# Patient Record
Sex: FEMALE | Race: WHITE | NOT HISPANIC OR LATINO | Employment: UNEMPLOYED | URBAN - METROPOLITAN AREA
[De-identification: names, ages, dates, MRNs, and addresses within clinical notes are randomized per-mention and may not be internally consistent; named-entity substitution may affect disease eponyms.]

---

## 2018-08-30 ENCOUNTER — APPOINTMENT (OUTPATIENT)
Dept: RADIOLOGY | Facility: MEDICAL CENTER | Age: 48
DRG: 247 | End: 2018-08-30
Payer: MEDICARE

## 2018-08-30 ENCOUNTER — HOSPITAL ENCOUNTER (INPATIENT)
Facility: MEDICAL CENTER | Age: 48
LOS: 2 days | DRG: 247 | End: 2018-09-02
Attending: EMERGENCY MEDICINE | Admitting: INTERNAL MEDICINE
Payer: MEDICARE

## 2018-08-30 LAB
ALBUMIN SERPL BCP-MCNC: 4.1 G/DL (ref 3.2–4.9)
ALBUMIN/GLOB SERPL: 1.2 G/DL
ALP SERPL-CCNC: 65 U/L (ref 30–99)
ALT SERPL-CCNC: 24 U/L (ref 2–50)
ANION GAP SERPL CALC-SCNC: 6 MMOL/L (ref 0–11.9)
APTT PPP: 30 SEC (ref 24.7–36)
AST SERPL-CCNC: 31 U/L (ref 12–45)
BASOPHILS # BLD AUTO: 0.5 % (ref 0–1.8)
BASOPHILS # BLD: 0.05 K/UL (ref 0–0.12)
BILIRUB SERPL-MCNC: 0.3 MG/DL (ref 0.1–1.5)
BUN SERPL-MCNC: 12 MG/DL (ref 8–22)
CALCIUM SERPL-MCNC: 8.8 MG/DL (ref 8.5–10.5)
CHLORIDE SERPL-SCNC: 99 MMOL/L (ref 96–112)
CO2 SERPL-SCNC: 28 MMOL/L (ref 20–33)
CREAT SERPL-MCNC: 0.79 MG/DL (ref 0.5–1.4)
EKG IMPRESSION: NORMAL
EOSINOPHIL # BLD AUTO: 0.07 K/UL (ref 0–0.51)
EOSINOPHIL NFR BLD: 0.8 % (ref 0–6.9)
ERYTHROCYTE [DISTWIDTH] IN BLOOD BY AUTOMATED COUNT: 46.3 FL (ref 35.9–50)
GLOBULIN SER CALC-MCNC: 3.4 G/DL (ref 1.9–3.5)
GLUCOSE SERPL-MCNC: 107 MG/DL (ref 65–99)
HCT VFR BLD AUTO: 47.1 % (ref 37–47)
HGB BLD-MCNC: 15.6 G/DL (ref 12–16)
IMM GRANULOCYTES # BLD AUTO: 0.03 K/UL (ref 0–0.11)
IMM GRANULOCYTES NFR BLD AUTO: 0.3 % (ref 0–0.9)
INR PPP: 0.95 (ref 0.87–1.13)
LIPASE SERPL-CCNC: 112 U/L (ref 11–82)
LYMPHOCYTES # BLD AUTO: 1.85 K/UL (ref 1–4.8)
LYMPHOCYTES NFR BLD: 20.2 % (ref 22–41)
MCH RBC QN AUTO: 30.1 PG (ref 27–33)
MCHC RBC AUTO-ENTMCNC: 33.1 G/DL (ref 33.6–35)
MCV RBC AUTO: 90.8 FL (ref 81.4–97.8)
MONOCYTES # BLD AUTO: 0.74 K/UL (ref 0–0.85)
MONOCYTES NFR BLD AUTO: 8.1 % (ref 0–13.4)
NEUTROPHILS # BLD AUTO: 6.44 K/UL (ref 2–7.15)
NEUTROPHILS NFR BLD: 70.1 % (ref 44–72)
NRBC # BLD AUTO: 0 K/UL
NRBC BLD-RTO: 0 /100 WBC
PLATELET # BLD AUTO: 226 K/UL (ref 164–446)
PMV BLD AUTO: 10.5 FL (ref 9–12.9)
POTASSIUM SERPL-SCNC: 3.5 MMOL/L (ref 3.6–5.5)
PROT SERPL-MCNC: 7.5 G/DL (ref 6–8.2)
PROTHROMBIN TIME: 12.4 SEC (ref 12–14.6)
RBC # BLD AUTO: 5.19 M/UL (ref 4.2–5.4)
SODIUM SERPL-SCNC: 133 MMOL/L (ref 135–145)
TROPONIN I SERPL-MCNC: 11.06 NG/ML (ref 0–0.04)
WBC # BLD AUTO: 9.2 K/UL (ref 4.8–10.8)

## 2018-08-30 PROCEDURE — 700101 HCHG RX REV CODE 250

## 2018-08-30 PROCEDURE — 307093 HCHG TR BAND RADIAL

## 2018-08-30 PROCEDURE — 700111 HCHG RX REV CODE 636 W/ 250 OVERRIDE (IP)

## 2018-08-30 PROCEDURE — C1769 GUIDE WIRE: HCPCS

## 2018-08-30 PROCEDURE — 99291 CRITICAL CARE FIRST HOUR: CPT

## 2018-08-30 PROCEDURE — 83690 ASSAY OF LIPASE: CPT

## 2018-08-30 PROCEDURE — C1894 INTRO/SHEATH, NON-LASER: HCPCS

## 2018-08-30 PROCEDURE — 83880 ASSAY OF NATRIURETIC PEPTIDE: CPT

## 2018-08-30 PROCEDURE — 99152 MOD SED SAME PHYS/QHP 5/>YRS: CPT

## 2018-08-30 PROCEDURE — 85025 COMPLETE CBC W/AUTO DIFF WBC: CPT

## 2018-08-30 PROCEDURE — C1725 CATH, TRANSLUMIN NON-LASER: HCPCS

## 2018-08-30 PROCEDURE — 80053 COMPREHEN METABOLIC PANEL: CPT

## 2018-08-30 PROCEDURE — 4A023N7 MEASUREMENT OF CARDIAC SAMPLING AND PRESSURE, LEFT HEART, PERCUTANEOUS APPROACH: ICD-10-PCS | Performed by: INTERNAL MEDICINE

## 2018-08-30 PROCEDURE — 93005 ELECTROCARDIOGRAM TRACING: CPT | Performed by: EMERGENCY MEDICINE

## 2018-08-30 PROCEDURE — 700102 HCHG RX REV CODE 250 W/ 637 OVERRIDE(OP): Performed by: INTERNAL MEDICINE

## 2018-08-30 PROCEDURE — 76937 US GUIDE VASCULAR ACCESS: CPT

## 2018-08-30 PROCEDURE — 700105 HCHG RX REV CODE 258

## 2018-08-30 PROCEDURE — C1874 STENT, COATED/COV W/DEL SYS: HCPCS

## 2018-08-30 PROCEDURE — A9270 NON-COVERED ITEM OR SERVICE: HCPCS

## 2018-08-30 PROCEDURE — 700105 HCHG RX REV CODE 258: Performed by: INTERNAL MEDICINE

## 2018-08-30 PROCEDURE — C9606 PERC D-E COR REVASC W AMI S: HCPCS | Mod: RC

## 2018-08-30 PROCEDURE — 304952 HCHG R 2 PADS

## 2018-08-30 PROCEDURE — 85610 PROTHROMBIN TIME: CPT

## 2018-08-30 PROCEDURE — 84484 ASSAY OF TROPONIN QUANT: CPT

## 2018-08-30 PROCEDURE — A9270 NON-COVERED ITEM OR SERVICE: HCPCS | Performed by: INTERNAL MEDICINE

## 2018-08-30 PROCEDURE — 99153 MOD SED SAME PHYS/QHP EA: CPT

## 2018-08-30 PROCEDURE — 700102 HCHG RX REV CODE 250 W/ 637 OVERRIDE(OP)

## 2018-08-30 PROCEDURE — 85730 THROMBOPLASTIN TIME PARTIAL: CPT

## 2018-08-30 PROCEDURE — C1887 CATHETER, GUIDING: HCPCS

## 2018-08-30 PROCEDURE — 700111 HCHG RX REV CODE 636 W/ 250 OVERRIDE (IP): Performed by: INTERNAL MEDICINE

## 2018-08-30 PROCEDURE — 93005 ELECTROCARDIOGRAM TRACING: CPT

## 2018-08-30 PROCEDURE — B2111ZZ FLUOROSCOPY OF MULTIPLE CORONARY ARTERIES USING LOW OSMOLAR CONTRAST: ICD-10-PCS | Performed by: INTERNAL MEDICINE

## 2018-08-30 PROCEDURE — 360979 HCHG DIAGNOSTIC CATH

## 2018-08-30 PROCEDURE — B2151ZZ FLUOROSCOPY OF LEFT HEART USING LOW OSMOLAR CONTRAST: ICD-10-PCS | Performed by: INTERNAL MEDICINE

## 2018-08-30 PROCEDURE — 93458 L HRT ARTERY/VENTRICLE ANGIO: CPT

## 2018-08-30 PROCEDURE — 027034Z DILATION OF CORONARY ARTERY, ONE ARTERY WITH DRUG-ELUTING INTRALUMINAL DEVICE, PERCUTANEOUS APPROACH: ICD-10-PCS | Performed by: INTERNAL MEDICINE

## 2018-08-30 RX ORDER — HEPARIN SODIUM,PORCINE 1000/ML
VIAL (ML) INJECTION
Status: COMPLETED
Start: 2018-08-30 | End: 2018-08-30

## 2018-08-30 RX ORDER — LIDOCAINE HYDROCHLORIDE 10 MG/ML
INJECTION, SOLUTION INFILTRATION; PERINEURAL
Status: COMPLETED
Start: 2018-08-30 | End: 2018-08-30

## 2018-08-30 RX ORDER — PRASUGREL 10 MG/1
10 TABLET, FILM COATED ORAL DAILY
Status: DISCONTINUED | OUTPATIENT
Start: 2018-08-31 | End: 2018-09-02 | Stop reason: HOSPADM

## 2018-08-30 RX ORDER — MIDAZOLAM HYDROCHLORIDE 1 MG/ML
INJECTION INTRAMUSCULAR; INTRAVENOUS
Status: COMPLETED
Start: 2018-08-30 | End: 2018-08-30

## 2018-08-30 RX ORDER — PRASUGREL 10 MG/1
TABLET, FILM COATED ORAL
Status: COMPLETED
Start: 2018-08-30 | End: 2018-08-30

## 2018-08-30 RX ORDER — ONDANSETRON 2 MG/ML
4 INJECTION INTRAMUSCULAR; INTRAVENOUS EVERY 4 HOURS PRN
Status: DISCONTINUED | OUTPATIENT
Start: 2018-08-30 | End: 2018-09-02 | Stop reason: HOSPADM

## 2018-08-30 RX ORDER — ATORVASTATIN CALCIUM 80 MG/1
80 TABLET, FILM COATED ORAL EVERY EVENING
Status: DISCONTINUED | OUTPATIENT
Start: 2018-08-30 | End: 2018-09-02 | Stop reason: HOSPADM

## 2018-08-30 RX ORDER — VERAPAMIL HYDROCHLORIDE 2.5 MG/ML
INJECTION, SOLUTION INTRAVENOUS
Status: COMPLETED
Start: 2018-08-30 | End: 2018-08-30

## 2018-08-30 RX ORDER — SODIUM CHLORIDE 9 MG/ML
INJECTION, SOLUTION INTRAVENOUS CONTINUOUS
Status: DISCONTINUED | OUTPATIENT
Start: 2018-08-30 | End: 2018-08-31

## 2018-08-30 RX ORDER — PRASUGREL 10 MG/1
60 TABLET, FILM COATED ORAL ONCE
Status: ACTIVE | OUTPATIENT
Start: 2018-08-30 | End: 2018-08-31

## 2018-08-30 RX ADMIN — HEPARIN SODIUM: 1000 INJECTION, SOLUTION INTRAVENOUS; SUBCUTANEOUS at 22:39

## 2018-08-30 RX ADMIN — HEPARIN SODIUM: 200 INJECTION, SOLUTION INTRAVENOUS at 22:45

## 2018-08-30 RX ADMIN — ATORVASTATIN CALCIUM 80 MG: 80 TABLET, FILM COATED ORAL at 23:45

## 2018-08-30 RX ADMIN — MIDAZOLAM HYDROCHLORIDE 2 MG: 1 INJECTION, SOLUTION INTRAMUSCULAR; INTRAVENOUS at 23:15

## 2018-08-30 RX ADMIN — VERAPAMIL HYDROCHLORIDE 5 MG: 2.5 INJECTION, SOLUTION INTRAVENOUS at 22:42

## 2018-08-30 RX ADMIN — BIVALIRUDIN 0.2 MG/KG/HR: 250 INJECTION, POWDER, LYOPHILIZED, FOR SOLUTION INTRAVENOUS at 23:15

## 2018-08-30 RX ADMIN — LIDOCAINE HYDROCHLORIDE: 10 INJECTION, SOLUTION INFILTRATION; PERINEURAL at 22:39

## 2018-08-30 RX ADMIN — PRASUGREL 60 MG: 10 TABLET, FILM COATED ORAL at 23:22

## 2018-08-30 RX ADMIN — NITROGLYCERIN 10 ML: 20 INJECTION INTRAVENOUS at 22:39

## 2018-08-30 RX ADMIN — FENTANYL CITRATE 100 MCG: 50 INJECTION, SOLUTION INTRAMUSCULAR; INTRAVENOUS at 23:15

## 2018-08-30 ASSESSMENT — PAIN SCALES - GENERAL: PAINLEVEL_OUTOF10: 0

## 2018-08-31 ENCOUNTER — APPOINTMENT (OUTPATIENT)
Dept: RADIOLOGY | Facility: MEDICAL CENTER | Age: 48
DRG: 247 | End: 2018-08-31
Attending: EMERGENCY MEDICINE
Payer: MEDICARE

## 2018-08-31 PROBLEM — I21.11 ST ELEVATION MYOCARDIAL INFARCTION INVOLVING RIGHT CORONARY ARTERY (HCC): Status: ACTIVE | Noted: 2018-08-31

## 2018-08-31 PROBLEM — B19.20 HEPATITIS C: Status: ACTIVE | Noted: 2018-08-31

## 2018-08-31 PROBLEM — E66.9 OBESITY (BMI 30-39.9): Status: ACTIVE | Noted: 2018-08-31

## 2018-08-31 PROBLEM — Z72.0 TOBACCO ABUSE: Status: ACTIVE | Noted: 2018-08-31

## 2018-08-31 LAB
ALBUMIN SERPL BCP-MCNC: 3.7 G/DL (ref 3.2–4.9)
ALBUMIN/GLOB SERPL: 1.2 G/DL
ALP SERPL-CCNC: 60 U/L (ref 30–99)
ALT SERPL-CCNC: 24 U/L (ref 2–50)
ANION GAP SERPL CALC-SCNC: 5 MMOL/L (ref 0–11.9)
ANION GAP SERPL CALC-SCNC: 5 MMOL/L (ref 0–11.9)
AST SERPL-CCNC: 63 U/L (ref 12–45)
BASOPHILS # BLD AUTO: 0.5 % (ref 0–1.8)
BASOPHILS # BLD: 0.04 K/UL (ref 0–0.12)
BILIRUB SERPL-MCNC: 0.3 MG/DL (ref 0.1–1.5)
BNP SERPL-MCNC: 25 PG/ML (ref 0–100)
BUN SERPL-MCNC: 10 MG/DL (ref 8–22)
BUN SERPL-MCNC: 10 MG/DL (ref 8–22)
CALCIUM SERPL-MCNC: 8.5 MG/DL (ref 8.5–10.5)
CALCIUM SERPL-MCNC: 8.5 MG/DL (ref 8.5–10.5)
CHLORIDE SERPL-SCNC: 103 MMOL/L (ref 96–112)
CHLORIDE SERPL-SCNC: 103 MMOL/L (ref 96–112)
CO2 SERPL-SCNC: 27 MMOL/L (ref 20–33)
CO2 SERPL-SCNC: 27 MMOL/L (ref 20–33)
CREAT SERPL-MCNC: 0.67 MG/DL (ref 0.5–1.4)
CREAT SERPL-MCNC: 0.67 MG/DL (ref 0.5–1.4)
EKG IMPRESSION: NORMAL
EOSINOPHIL # BLD AUTO: 0.06 K/UL (ref 0–0.51)
EOSINOPHIL NFR BLD: 0.7 % (ref 0–6.9)
ERYTHROCYTE [DISTWIDTH] IN BLOOD BY AUTOMATED COUNT: 46.1 FL (ref 35.9–50)
GLOBULIN SER CALC-MCNC: 3.1 G/DL (ref 1.9–3.5)
GLUCOSE SERPL-MCNC: 101 MG/DL (ref 65–99)
GLUCOSE SERPL-MCNC: 101 MG/DL (ref 65–99)
HCT VFR BLD AUTO: 43.1 % (ref 37–47)
HGB BLD-MCNC: 14.6 G/DL (ref 12–16)
IMM GRANULOCYTES # BLD AUTO: 0.03 K/UL (ref 0–0.11)
IMM GRANULOCYTES NFR BLD AUTO: 0.4 % (ref 0–0.9)
LV EJECT FRACT  99904: 60
LV EJECT FRACT MOD 2C 99903: 58.45
LV EJECT FRACT MOD 4C 99902: 57.12
LV EJECT FRACT MOD BP 99901: 57.71
LYMPHOCYTES # BLD AUTO: 2.28 K/UL (ref 1–4.8)
LYMPHOCYTES NFR BLD: 28.1 % (ref 22–41)
MAGNESIUM SERPL-MCNC: 1.9 MG/DL (ref 1.5–2.5)
MCH RBC QN AUTO: 30.6 PG (ref 27–33)
MCHC RBC AUTO-ENTMCNC: 33.9 G/DL (ref 33.6–35)
MCV RBC AUTO: 90.4 FL (ref 81.4–97.8)
MONOCYTES # BLD AUTO: 0.69 K/UL (ref 0–0.85)
MONOCYTES NFR BLD AUTO: 8.5 % (ref 0–13.4)
NEUTROPHILS # BLD AUTO: 5.02 K/UL (ref 2–7.15)
NEUTROPHILS NFR BLD: 61.8 % (ref 44–72)
NRBC # BLD AUTO: 0 K/UL
NRBC BLD-RTO: 0 /100 WBC
PLATELET # BLD AUTO: 229 K/UL (ref 164–446)
PMV BLD AUTO: 10.7 FL (ref 9–12.9)
POTASSIUM SERPL-SCNC: 3.6 MMOL/L (ref 3.6–5.5)
POTASSIUM SERPL-SCNC: 3.6 MMOL/L (ref 3.6–5.5)
PROT SERPL-MCNC: 6.8 G/DL (ref 6–8.2)
RBC # BLD AUTO: 4.77 M/UL (ref 4.2–5.4)
SODIUM SERPL-SCNC: 135 MMOL/L (ref 135–145)
SODIUM SERPL-SCNC: 135 MMOL/L (ref 135–145)
TROPONIN I SERPL-MCNC: 72.24 NG/ML (ref 0–0.04)
WBC # BLD AUTO: 8.1 K/UL (ref 4.8–10.8)

## 2018-08-31 PROCEDURE — 80053 COMPREHEN METABOLIC PANEL: CPT

## 2018-08-31 PROCEDURE — 93005 ELECTROCARDIOGRAM TRACING: CPT | Performed by: INTERNAL MEDICINE

## 2018-08-31 PROCEDURE — 93010 ELECTROCARDIOGRAM REPORT: CPT | Performed by: INTERNAL MEDICINE

## 2018-08-31 PROCEDURE — 83735 ASSAY OF MAGNESIUM: CPT

## 2018-08-31 PROCEDURE — 99223 1ST HOSP IP/OBS HIGH 75: CPT | Mod: AI | Performed by: INTERNAL MEDICINE

## 2018-08-31 PROCEDURE — 700102 HCHG RX REV CODE 250 W/ 637 OVERRIDE(OP): Performed by: INTERNAL MEDICINE

## 2018-08-31 PROCEDURE — 84484 ASSAY OF TROPONIN QUANT: CPT

## 2018-08-31 PROCEDURE — 700105 HCHG RX REV CODE 258: Performed by: INTERNAL MEDICINE

## 2018-08-31 PROCEDURE — 85025 COMPLETE CBC W/AUTO DIFF WBC: CPT

## 2018-08-31 PROCEDURE — A9270 NON-COVERED ITEM OR SERVICE: HCPCS | Performed by: INTERNAL MEDICINE

## 2018-08-31 PROCEDURE — 71045 X-RAY EXAM CHEST 1 VIEW: CPT

## 2018-08-31 PROCEDURE — 93306 TTE W/DOPPLER COMPLETE: CPT | Mod: 26 | Performed by: INTERNAL MEDICINE

## 2018-08-31 PROCEDURE — 770022 HCHG ROOM/CARE - ICU (200)

## 2018-08-31 PROCEDURE — 93306 TTE W/DOPPLER COMPLETE: CPT

## 2018-08-31 RX ORDER — HYDROCODONE BITARTRATE AND ACETAMINOPHEN 10; 325 MG/1; MG/1
.5-1 TABLET ORAL EVERY 4 HOURS PRN
Status: DISCONTINUED | OUTPATIENT
Start: 2018-08-31 | End: 2018-09-02 | Stop reason: HOSPADM

## 2018-08-31 RX ORDER — VENLAFAXINE 75 MG/1
150 TABLET ORAL DAILY
Status: DISCONTINUED | OUTPATIENT
Start: 2018-08-31 | End: 2018-08-31

## 2018-08-31 RX ORDER — TRAZODONE HYDROCHLORIDE 50 MG/1
100 TABLET ORAL NIGHTLY
Status: DISCONTINUED | OUTPATIENT
Start: 2018-08-31 | End: 2018-08-31

## 2018-08-31 RX ORDER — LAMOTRIGINE 100 MG/1
100 TABLET ORAL 2 TIMES DAILY
Status: DISCONTINUED | OUTPATIENT
Start: 2018-08-31 | End: 2018-09-02 | Stop reason: HOSPADM

## 2018-08-31 RX ORDER — VENLAFAXINE HYDROCHLORIDE 75 MG/1
150 CAPSULE, EXTENDED RELEASE ORAL
Status: DISCONTINUED | OUTPATIENT
Start: 2018-08-31 | End: 2018-09-02 | Stop reason: HOSPADM

## 2018-08-31 RX ORDER — BISACODYL 10 MG
10 SUPPOSITORY, RECTAL RECTAL
Status: DISCONTINUED | OUTPATIENT
Start: 2018-08-31 | End: 2018-09-02 | Stop reason: HOSPADM

## 2018-08-31 RX ORDER — AMOXICILLIN 250 MG
2 CAPSULE ORAL 2 TIMES DAILY
Status: DISCONTINUED | OUTPATIENT
Start: 2018-08-31 | End: 2018-09-02 | Stop reason: HOSPADM

## 2018-08-31 RX ORDER — TRAZODONE HYDROCHLORIDE 50 MG/1
200 TABLET ORAL NIGHTLY
Status: DISCONTINUED | OUTPATIENT
Start: 2018-08-31 | End: 2018-09-02 | Stop reason: HOSPADM

## 2018-08-31 RX ORDER — POLYETHYLENE GLYCOL 3350 17 G/17G
1 POWDER, FOR SOLUTION ORAL
Status: DISCONTINUED | OUTPATIENT
Start: 2018-08-31 | End: 2018-09-02 | Stop reason: HOSPADM

## 2018-08-31 RX ORDER — VENLAFAXINE HYDROCHLORIDE 75 MG/1
150 CAPSULE, EXTENDED RELEASE ORAL
COMMUNITY
End: 2019-12-03

## 2018-08-31 RX ORDER — NITROGLYCERIN 0.4 MG/1
0.4 TABLET SUBLINGUAL
Status: DISCONTINUED | OUTPATIENT
Start: 2018-08-31 | End: 2018-09-02 | Stop reason: HOSPADM

## 2018-08-31 RX ORDER — AMLODIPINE BESYLATE 10 MG/1
10 TABLET ORAL DAILY
Status: ON HOLD | COMMUNITY
End: 2018-09-02

## 2018-08-31 RX ORDER — ACETAMINOPHEN 325 MG/1
650 TABLET ORAL EVERY 6 HOURS PRN
Status: DISCONTINUED | OUTPATIENT
Start: 2018-08-31 | End: 2018-09-02 | Stop reason: HOSPADM

## 2018-08-31 RX ADMIN — VENLAFAXINE HYDROCHLORIDE 150 MG: 75 CAPSULE, EXTENDED RELEASE ORAL at 20:58

## 2018-08-31 RX ADMIN — PRASUGREL 10 MG: 10 TABLET, FILM COATED ORAL at 05:29

## 2018-08-31 RX ADMIN — ASPIRIN 81 MG: 81 TABLET, DELAYED RELEASE ORAL at 05:28

## 2018-08-31 RX ADMIN — METOPROLOL TARTRATE 25 MG: 25 TABLET, FILM COATED ORAL at 01:23

## 2018-08-31 RX ADMIN — SODIUM CHLORIDE: 9 INJECTION, SOLUTION INTRAVENOUS at 00:00

## 2018-08-31 RX ADMIN — TRAZODONE HYDROCHLORIDE 200 MG: 50 TABLET ORAL at 20:58

## 2018-08-31 RX ADMIN — ATORVASTATIN CALCIUM 80 MG: 80 TABLET, FILM COATED ORAL at 17:06

## 2018-08-31 RX ADMIN — LAMOTRIGINE 100 MG: 100 TABLET ORAL at 01:24

## 2018-08-31 RX ADMIN — VENLAFAXINE 150 MG: 75 TABLET ORAL at 05:31

## 2018-08-31 RX ADMIN — LAMOTRIGINE 100 MG: 100 TABLET ORAL at 17:06

## 2018-08-31 RX ADMIN — TRAZODONE HYDROCHLORIDE 100 MG: 50 TABLET ORAL at 01:27

## 2018-08-31 RX ADMIN — ACETAMINOPHEN 650 MG: 325 TABLET, FILM COATED ORAL at 20:58

## 2018-08-31 RX ADMIN — Medication 1 PACKET: at 18:00

## 2018-08-31 RX ADMIN — METOPROLOL TARTRATE 25 MG: 25 TABLET, FILM COATED ORAL at 17:06

## 2018-08-31 ASSESSMENT — ENCOUNTER SYMPTOMS
HEARTBURN: 0
SEIZURES: 0
FEVER: 0
DIAPHORESIS: 0
SPUTUM PRODUCTION: 0
MUSCULOSKELETAL NEGATIVE: 1
COUGH: 0
MEMORY LOSS: 0
PND: 0
PALPITATIONS: 0
FOCAL WEAKNESS: 0
BLURRED VISION: 0
DIARRHEA: 0
NECK PAIN: 0
DEPRESSION: 0
BACK PAIN: 0
SHORTNESS OF BREATH: 0
NAUSEA: 0
MYALGIAS: 0
ABDOMINAL PAIN: 0
HEADACHES: 0
WHEEZING: 0
LOSS OF CONSCIOUSNESS: 0
VOMITING: 0
DIZZINESS: 0
SORE THROAT: 0
FLANK PAIN: 0
BLOOD IN STOOL: 0
BRUISES/BLEEDS EASILY: 0
CHILLS: 0
SHORTNESS OF BREATH: 1

## 2018-08-31 ASSESSMENT — COGNITIVE AND FUNCTIONAL STATUS - GENERAL
DAILY ACTIVITIY SCORE: 23
TOILETING: A LITTLE
MOBILITY SCORE: 22
WALKING IN HOSPITAL ROOM: A LITTLE
CLIMB 3 TO 5 STEPS WITH RAILING: A LITTLE
SUGGESTED CMS G CODE MODIFIER MOBILITY: CJ
SUGGESTED CMS G CODE MODIFIER DAILY ACTIVITY: CI

## 2018-08-31 ASSESSMENT — LIFESTYLE VARIABLES
TOTAL SCORE: 2
AVERAGE NUMBER OF DAYS PER WEEK YOU HAVE A DRINK CONTAINING ALCOHOL: 4
TOTAL SCORE: 2
EVER_SMOKED: YES
EVER_SMOKED: YES
ON A TYPICAL DAY WHEN YOU DRINK ALCOHOL HOW MANY DRINKS DO YOU HAVE: 5
DOES PATIENT WANT TO STOP DRINKING: YES
CONSUMPTION TOTAL: POSITIVE
EVER HAD A DRINK FIRST THING IN THE MORNING TO STEADY YOUR NERVES TO GET RID OF A HANGOVER: YES
DOES PATIENT WANT TO TALK TO SOMEONE ABOUT QUITTING: NO
TOTAL SCORE: 2
ALCOHOL_USE: YES
HOW MANY TIMES IN THE PAST YEAR HAVE YOU HAD 5 OR MORE DRINKS IN A DAY: 200
EVER FELT BAD OR GUILTY ABOUT YOUR DRINKING: NO
HAVE PEOPLE ANNOYED YOU BY CRITICIZING YOUR DRINKING: NO
HAVE YOU EVER FELT YOU SHOULD CUT DOWN ON YOUR DRINKING: YES

## 2018-08-31 ASSESSMENT — PAIN SCALES - GENERAL
PAINLEVEL_OUTOF10: 0
PAINLEVEL_OUTOF10: 2
PAINLEVEL_OUTOF10: 0
PAINLEVEL_OUTOF10: 3
PAINLEVEL_OUTOF10: 0

## 2018-08-31 ASSESSMENT — COPD QUESTIONNAIRES
HAVE YOU SMOKED AT LEAST 100 CIGARETTES IN YOUR ENTIRE LIFE: YES
DO YOU EVER COUGH UP ANY MUCUS OR PHLEGM?: NO/ONLY WITH OCCASIONAL COLDS OR INFECTIONS
DURING THE PAST 4 WEEKS HOW MUCH DID YOU FEEL SHORT OF BREATH: NONE/LITTLE OF THE TIME
COPD SCREENING SCORE: 2

## 2018-08-31 ASSESSMENT — PATIENT HEALTH QUESTIONNAIRE - PHQ9
7. TROUBLE CONCENTRATING ON THINGS, SUCH AS READING THE NEWSPAPER OR WATCHING TELEVISION: NOT AT ALL
3. TROUBLE FALLING OR STAYING ASLEEP OR SLEEPING TOO MUCH: SEVERAL DAYS
9. THOUGHTS THAT YOU WOULD BE BETTER OFF DEAD, OR OF HURTING YOURSELF: NOT AT ALL
8. MOVING OR SPEAKING SO SLOWLY THAT OTHER PEOPLE COULD HAVE NOTICED. OR THE OPPOSITE, BEING SO FIGETY OR RESTLESS THAT YOU HAVE BEEN MOVING AROUND A LOT MORE THAN USUAL: NOT AT ALL
5. POOR APPETITE OR OVEREATING: NOT AT ALL
6. FEELING BAD ABOUT YOURSELF - OR THAT YOU ARE A FAILURE OR HAVE LET YOURSELF OR YOUR FAMILY DOWN: NOT AL ALL
SUM OF ALL RESPONSES TO PHQ QUESTIONS 1-9: 6
SUM OF ALL RESPONSES TO PHQ9 QUESTIONS 1 AND 2: 2
4. FEELING TIRED OR HAVING LITTLE ENERGY: NEARLY EVERY DAY
1. LITTLE INTEREST OR PLEASURE IN DOING THINGS: SEVERAL DAYS
2. FEELING DOWN, DEPRESSED, IRRITABLE, OR HOPELESS: SEVERAL DAYS

## 2018-08-31 NOTE — CONSULTS
Reason of Consult: STEMI    Consulting Physician: XOCHILT Childers    HPI:  48F with Bipolar disorder, hypertension and previous history of hepatitis C presented with several hours of crushing substernal chest pain to [facility] in [facility] where she was diagnosed with an inferior ST elevation myocardial infarction.  She was given thrombolytic therapy and transported to [facility].  Upon arrival her chest pain is rated at a 1 out of 10 previously a 10 out of 10 and her ST segments have normalized.  It has been 2.5 hours since the administration of tenecteplase per EMS.  She indicates that she has a smoking history but no cardiac history, family history is reviewed and noncontributory to her current issue, she drinks but does not use drugs besides marijuana.  In the emergency department she is hemodynamically and electrically stable.    Past Medical History:   Diagnosis Date   • Behavior problem    • Bipolar 1 disorder, manic, moderate    • Chronic pain syndrome    • Drug-seeking behavior    • Foot fracture    • Hep C w/ coma, chronic    • Hypothyroid    • Migraines    • Polycythemia        Social History     Social History   • Marital status:      Spouse name: N/A   • Number of children: N/A   • Years of education: N/A     Occupational History   • Not on file.     Social History Main Topics   • Smoking status: Current Every Day Smoker   • Smokeless tobacco: Not on file   • Alcohol use Yes      Comment: weekly   • Drug use: Yes      Comment: marijuana   • Sexual activity: Not on file     Other Topics Concern   • Not on file     Social History Narrative   • No narrative on file       No current facility-administered medications on file prior to encounter.      Current Outpatient Prescriptions on File Prior to Encounter   Medication Sig Dispense Refill   • hydrocodone/acetaminophen (NORCO)  MG TABS Take 0.5-1 Tabs by mouth every four hours as needed ((Pain Scale 4-6)). 40 Tab 0   •  temazepam (RESTORIL) 15 MG CAPS Take 15 mg by mouth at bedtime as needed.     • ibuprofen (MOTRIN) 600 MG TABS Take 600 mg by mouth every 6 hours as needed.     • asa/apap/caffeine (EXCEDRIN) 250-250-65 MG TABS Take 1 Tab by mouth every 6 hours as needed. Indications: Headache     • nitroglycerin (NITROSTAT) 0.4 MG SUBL Place 0.4 mg under tongue every 5 minutes as needed. Indications: for atypical chest pain     • trazodone (DESYREL) 100 MG TABS Take 100 mg by mouth every evening.     • lamotrigine (LAMICTAL) 100 MG TABS Take 100 mg by mouth 2 Times a Day.     • venlafaxine (EFFEXOR) 50 MG tablet Take 150 mg by mouth 3 times a day.     • hydrochlorothiazide (HYDRODIURIL) 25 MG TABS Take 25 mg by mouth every day.     • propranolol (INDERAL) 20 MG TABS Take 20 mg by mouth every day.         Current Facility-Administered Medications   Medication Dose Frequency Provider Last Rate Last Dose   • HEPARIN 1000 UNITS/ML OR USE ONLY           • HEPARIN (PORCINE) IN NACL 2-0.9 UNIT/ML-% INJ SOLN           • NITROGLYCERIN 2 MG IV SOLN           • LIDOCAINE HCL 1 % INJ SOLN             Last reviewed on 3/31/2014  2:43 PM by Garett Guerrero R.N.    Compazine    No family history on file.    ROS: As per HPI all other systems reviewed and negative     Physical Exam   There were no vitals taken for this visit.    Constitutional: Obese.  Appears well-developed.   HENT: Normocephalic and atraumatic. No scleral icterus.   Neck: No JVD present.   Cardiovascular: Normal rate. Exam reveals no gallop and no friction rub. No murmur heard.   Pulmonary/Chest: CTAB    Abdominal: S/NT/ND BS+   Musculoskeletal:  Pulses present. No atrophy. Strength normal.  Extremities: Exhibits no edema. No clubbing or cyanosis.   Skin: Skin is warm and dry.   Neuro: Non-focal, CN 2-12 intact grossly    No intake or output data in the 24 hours ending 08/30/18 1177                              EKG (8/30/2018 ):  I have personally reviewed the EKG this visit  and discussed with the patient. It shows inferior ST elevation with reciprocal depressions in sinus rhythm.  Repeat completed after tenecteplase shows resolution of ST segments.    Imaging reviewed    Impressions:  1.  Inferior STEMI status post successful thrombolytic therapy  2.  Hypertension  3.  Tobacco abuse  4.  Bipolar disorder  5.  Hepatitis C  6.  Obesity    Recommendations:  Recommend urgent coronary angiography as it has been over 2 hours since her tonight to place was given.  She is stable we will proceed for possible percutaneous intervention if necessary.  Recommend dual antiplatelet therapy, optimal medical therapy for CAD, and an echocardiogram.    Thank you for this interesting consultation. It was my pleasure to see Stephanie Andrade today.    Hernan Pack MD, FACC, [facility]  Division of Interventional Cardiology  [facility]      Discussed with the referring physician and bedside nursing.

## 2018-08-31 NOTE — ED PROVIDER NOTES
"CHIEF COMPLAINT  No chief complaint on file.      HPI  Stephanie Andrade is a 48 y.o. female who presents from [facility] following chest pain evaluation showing stemi-morphology on EKG in the inferior leads.  She was given aspirin and tenecteplase prior to transfer at around 8:20 PM.    Patient states that around 6 or 6:30 PM this evening, she developed severe chest pain while at rest.  Associated diaphoresis.  No vomiting.  No syncope or shortness of breath.  Has a history of hypertension, smoking, \"atypical angina\".   Not actively followed by a cardiologist currently.  Denies prior cardiac intervention.  Denies prior history of diabetes.    Remained hemodynamically stable and was transferred with a nitro drip due to continued chest pain symptoms.  Initially had 10 out of 10 pain.  Upon arrival here, pain was 1/10.      REVIEW OF SYSTEMS  See HPI for further details. All other systems are negative.     PAST MEDICAL HISTORY   has a past medical history of Behavior problem; Bipolar 1 disorder, manic, moderate; Chronic pain syndrome; Drug-seeking behavior; Foot fracture; Hep C w/ coma, chronic; Hypothyroid; Migraines; and Polycythemia.    SOCIAL HISTORY  Social History     Social History Main Topics   • Smoking status: Current Every Day Smoker   • Smokeless tobacco: Not on file   • Alcohol use Yes      Comment: weekly   • Drug use: Yes      Comment: marijuana   • Sexual activity: Not on file       SURGICAL HISTORY   has a past surgical history that includes tibia nailing intramedullary (3/31/2014) and ankle orif (3/31/2014).    CURRENT MEDICATIONS  Home Medications     Reviewed by Elif Hackett, PharmD (Pharmacist) on 08/31/18 at 0952  Med List Status: Complete   Medication Last Dose Status   amLODIPine (NORVASC) 10 MG Tab  Active   asa/apap/caffeine (EXCEDRIN) 250-250-65 MG TABS PRN  Active   hydrochlorothiazide (HYDRODIURIL) 25 MG TABS  Active   lamotrigine (LAMICTAL) 100 MG TABS  Active " "  nitroglycerin (NITROSTAT) 0.4 MG SUBL PRN Active   propranolol (INDERAL) 20 MG TABS  Active   psyllium (METAMUCIL) 58.12 % Pack  Active   temazepam (RESTORIL) 15 MG CAPS PRN Active   trazodone (DESYREL) 100 MG TABS  Active   venlafaxine XR (EFFEXOR XR) 75 MG CAPSULE SR 24 HR  Active                ALLERGIES  Allergies   Allergen Reactions   • Compazine        PHYSICAL EXAM  VITAL SIGNS: /77   Pulse 85   Temp 36.8 °C (98.2 °F)   Resp 18   Ht 1.753 m (5' 9\")   Wt 103.9 kg (229 lb 0.9 oz)   LMP 08/31/2018   SpO2 97%   Breastfeeding? No   BMI 33.83 kg/m²   Pulse ox interpretation: I interpret this pulse ox as normal.  Constitutional: Alert in no apparent distress. Obese.  HENT: No signs of trauma, Bilateral external ears normal, Nose normal.   Eyes: Pupils are equal and reactive, Conjunctiva normal, Non-icteric.   Neck: Normal range of motion, No tenderness, Supple, No stridor.   Cardiovascular: Regular rate and rhythm, no murmurs.   Thorax & Lungs: Normal breath sounds, No respiratory distress, No wheezing, No chest tenderness.   Abdomen: Bowel sounds normal, Soft, No tenderness, No masses, No pulsatile masses. No peritoneal signs.  Skin: Warm, Dry, No erythema, No rash.   Back: No bony tenderness, No CVA tenderness.   Extremities: Intact distal pulses, No edema, No tenderness, No cyanosis  Musculoskeletal: Good range of motion in all major joints. No tenderness to palpation or major deformities noted.   Neurologic: Alert , Normal motor function and gait, Normal sensory function, No focal deficits noted.   Psychiatric: Affect normal, Judgment normal, Mood normal.       DIAGNOSTIC STUDIES / PROCEDURES    EKG   8/30/2018 at 2225  Normal sinus rhythm at 82  LA, QRS, QTC within normal limits  No ST elevations or depressions  There is some artifact due to motion  Inferior STEMI morphology resolved compared to prior EKG from the outside transferring facility where there is inferior STEMI with  And ST " depressions in V1 and V2    LABS  Labs Reviewed   TROPONIN - Abnormal; Notable for the following:        Result Value    Troponin I 11.06 (*)     All other components within normal limits    Narrative:     Indicate which anticoagulants the patient is on:->UNKNOWN   CBC WITH DIFFERENTIAL - Abnormal; Notable for the following:     Hematocrit 47.1 (*)     MCHC 33.1 (*)     Lymphocytes 20.20 (*)     All other components within normal limits    Narrative:     Indicate which anticoagulants the patient is on:->UNKNOWN   COMP METABOLIC PANEL - Abnormal; Notable for the following:     Sodium 133 (*)     Potassium 3.5 (*)     Glucose 107 (*)     All other components within normal limits    Narrative:     Indicate which anticoagulants the patient is on:->UNKNOWN   LIPASE - Abnormal; Notable for the following:     Lipase 112 (*)     All other components within normal limits    Narrative:     Indicate which anticoagulants the patient is on:->UNKNOWN   BASIC METABOLIC PANEL - Abnormal; Notable for the following:     Glucose 101 (*)     All other components within normal limits    Narrative:     Basic Metabolic Panel (BMP) in AM   COMP METABOLIC PANEL - Abnormal; Notable for the following:     Glucose 101 (*)     AST(SGOT) 63 (*)     All other components within normal limits    Narrative:     Basic Metabolic Panel (BMP) in AM   BTYPE NATRIURETIC PEPTIDE    Narrative:     Indicate which anticoagulants the patient is on:->UNKNOWN   PROTHROMBIN TIME    Narrative:     Indicate which anticoagulants the patient is on:->UNKNOWN   APTT    Narrative:     Indicate which anticoagulants the patient is on:->UNKNOWN   ESTIMATED GFR    Narrative:     Indicate which anticoagulants the patient is on:->UNKNOWN   ESTIMATED GFR    Narrative:     Basic Metabolic Panel (BMP) in AM       RADIOLOGY  DX-CHEST-PORTABLE (1 VIEW)   Final Result      1.  Minimal bilateral lower lung atelectasis.   2.  No pneumonia or pneumothorax.      ECHOCARDIOGRAM COMP W/O  CONT    (Results Pending)         COURSE & MEDICAL DECISION MAKING  Pertinent Labs & Imaging studies reviewed. (See chart for details)  48 y.o.   Female presenting as a transfer from an outside facility,  [facility], for suspected STEMI.  Had severe chest pain while at rest associated with diaphoresis.  Onset was at around 6 PM.  Has a history of hypertension and smoking.  No prior cardiovascular disease history.  No vomiting.  At the outside hospital had clear inferior STEMI morphology with reciprocal changes.  She was given tenecteplase and aspirin.  This was given at around 8:30 PM.  Patient arrived in the emergency department at around 10:30 PM.  She reports feeling much improved.  Was transferred on a nitro drip that was discontinued upon arrival.  EKG upon arrival did not show continued STEMI morphology.  Did review the patient's EKG from the outside facility with clear STEMI pattern.  Cardiology was available upon the patient's arrival.  Recommending cardiac catheterization.  Patient was agreeable with the plan.  Resting comfortably and in no distress.  Initial laboratory studies did show evidence of troponin elevation to 11.    The total critical care time on this patient is 35 minutes, resuscitating patient, speaking with admitting physician, and deciphering test results. This 35 minutes is exclusive of separately billable procedures.      Spoke with the hospitalist, Dr. Ramos who agrees to the admission.    FINAL IMPRESSION    ST elevation myocardial infarction        Electronically signed by: Alistair Silveira, 8/30/2018 10:46 PM

## 2018-08-31 NOTE — PROGRESS NOTES
Cardiology Progress Note               Author: Lolis Su Date & Time created: 8/31/2018  11:44 AM     Interval History:    No events on telemetry  Quiet after intervention  Mild achy chest pain that is constant this morning, different, 0.5 out of 10    Chief Complaint:  STEMI/IMI from [facility].  Drug-eluting stent to the culprit proximal RCA  No significant residual disease    Review of Systems   Constitutional: Negative for chills and fever.   HENT: Negative for hearing loss and tinnitus.    Respiratory: Negative for cough, shortness of breath and wheezing.    Cardiovascular: Positive for chest pain. Negative for palpitations, leg swelling and PND.   Gastrointestinal: Negative for heartburn and nausea.   Musculoskeletal: Negative.    Skin: Negative for rash.   Neurological: Negative for dizziness and loss of consciousness.   Endo/Heme/Allergies: Does not bruise/bleed easily.   Psychiatric/Behavioral: Negative for depression and memory loss.   All other systems reviewed and are negative.      Physical Exam   Constitutional: She is oriented to person, place, and time.   Obese, sitting crosslegged in bed, talkative   HENT:   Head: Normocephalic and atraumatic.   Eyes: Pupils are equal, round, and reactive to light. EOM are normal.   Neck: Neck supple. No JVD present. No thyromegaly present.   Cardiovascular: Normal rate, regular rhythm and intact distal pulses.  Exam reveals no friction rub.    No murmur heard.  Pulmonary/Chest: Breath sounds normal. No respiratory distress. She exhibits no tenderness.   Abdominal: Bowel sounds are normal. She exhibits no distension.   Musculoskeletal: She exhibits no edema or tenderness.   Lymphadenopathy:     She has no cervical adenopathy.   Neurological: She is alert and oriented to person, place, and time. She exhibits normal muscle tone. Coordination normal.   Skin: Skin is warm and dry. No rash noted.   Psychiatric: She has a normal mood and affect. Her  behavior is normal.       Hemodynamics:  Temp (24hrs), Av.4 °C (97.6 °F), Min:36.2 °C (97.1 °F), Max:37.1 °C (98.8 °F)  Temperature: 37.1 °C (98.8 °F)  Pulse  Av.2  Min: 70  Max: 97Heart Rate (Monitored): 86  Blood Pressure: 119/77, NIBP: 128/80     Respiratory:    Respiration: 19, Pulse Oximetry: 96 %, O2 Daily Delivery Respiratory : Silicone Nasal Cannula     Work Of Breathing / Effort: Mild  RUL Breath Sounds: Clear, RML Breath Sounds: Clear;Diminished, RLL Breath Sounds: Diminished, OXANA Breath Sounds: Clear, LLL Breath Sounds: Diminished  Fluids:  Date 18 0700 - 18 0659   Shift 6233-2030 0911-5800 2649-8662 24 Hour Total   I  N  T  A  K  E   I.V. 375   375    Shift Total 375   375   O  U  T  P  U  T   Urine 200   200    Shift Total 200   200   Weight (kg) 103.9 103.9 103.9 103.9       Weight: 103.9 kg (229 lb 0.9 oz)  GI/Nutrition:  Orders Placed This Encounter   Procedures   • Diet Order Cardiac     Standing Status:   Standing     Number of Occurrences:   1     Order Specific Question:   Diet:     Answer:   Cardiac [6]     Lab Results:  Recent Labs      18   0340   WBC  9.2  8.1   RBC  5.19  4.77   HEMOGLOBIN  15.6  14.6   HEMATOCRIT  47.1*  43.1   MCV  90.8  90.4   MCH  30.1  30.6   MCHC  33.1*  33.9   RDW  46.3  46.1   PLATELETCT  226  229   MPV  10.5  10.7     Recent Labs      18   0340   SODIUM  133*  135  135   POTASSIUM  3.5*  3.6  3.6   CHLORIDE  99  103  103   CO2  28  27  27   GLUCOSE  107*  101*  101*   BUN  12  10  10   CREATININE  0.79  0.67  0.67   CALCIUM  8.8  8.5  8.5     Recent Labs      18   APTT  30.0   INR  0.95     Recent Labs      18   BNPBTYPENAT  25     Recent Labs      1818   0340   TROPONINI  11.06*   --   72.24*   BNPBTYPENAT   --   25   --              Medical Decision Making, by Problem:  Active Hospital Problems    Diagnosis   • ST elevation  myocardial infarction involving right coronary artery (HCC) [I21.11]   • Tobacco abuse [Z72.0]   • Hepatitis C [B19.20]   • Bipolar 1 disorder, manic, moderate (HCC) [F31.12]       Plan:    48-year-old woman with psychiatric disease admit in transfer 8/30 for acute MI.  Stable today.  No concomitant heart block shock    Coronary disease myocardial infarction  Dual antiplatelet therapy, will check because before discharge  High-dose Lipitor  Low-dose beta-blocker  Troponin still going up, will check again  Echo pending but no heart failure symptoms today    Chest pain  Likely due to recent heart attack, no evidence of in-stent stenosis or thrombosis based on ECG    Okay to ambulate shower  We will follow, discussed with nursing, if chest pain changes will be on case  Okay for telemetry    Quality-Core Measures

## 2018-08-31 NOTE — H&P
Hospital Medicine History & Physical Note    Date of Service  8/31/2018    Primary Care Physician  Marcelo Martinez M.D.    Consultants  Cardiology     Code Status  Full code chest pain    Chief Complaint  Chest pain    History of Presenting Illness  48 y.o. female with past medical history of obesity, tobacco abuse, hypertension, hep C, bipolar disorder who presented 8/30/2018 with chest pain that started several hours prior to arrival.  She reported crushing substernal chest pain without radiation associated with shortness of breath.  She presented to an [facility] facility where she was diagnosed with an inferior ST elevation MI.  She was getting thrombolytic therapy and transferred to [facility] for further management.  She was taken for urgent cardiac catheterization and found to have 99% focal proximal RCA stenosis status post PCI and stent placement.  LVEF 65%.    EKG interpreted by me reveals sinus rhythm with Q waves in inferior leads.  Chest x-ray interpreted by me reveals minimal bilateral atelectasis.  No obvious pulmonary focal consolidations or pulmonary edema    Review of Systems  Review of Systems   Constitutional: Negative for chills, diaphoresis and fever.   HENT: Negative for hearing loss and sore throat.    Eyes: Negative for blurred vision.   Respiratory: Positive for shortness of breath. Negative for cough, sputum production and wheezing.    Cardiovascular: Positive for chest pain. Negative for palpitations and leg swelling.   Gastrointestinal: Negative for abdominal pain, blood in stool, diarrhea, nausea and vomiting.   Genitourinary: Negative for dysuria, flank pain and urgency.   Musculoskeletal: Negative for back pain, joint pain, myalgias and neck pain.   Skin: Negative for rash.   Neurological: Negative for dizziness, focal weakness, seizures and headaches.   Endo/Heme/Allergies: Does not bruise/bleed easily.   Psychiatric/Behavioral: Negative for suicidal ideas.   All other  systems reviewed and are negative.      Past Medical History   has a past medical history of Behavior problem; Bipolar 1 disorder, manic, moderate (HCC); Chronic pain syndrome; Drug-seeking behavior; Foot fracture; Hep C w/ coma, chronic (HCC); Hypothyroid; Migraines; and Polycythemia.    Surgical History   has a past surgical history that includes tibia nailing intramedullary (3/31/2014) and ankle orif (3/31/2014).     Family History  No pertinent family history    Social History   reports that she has been smoking.  She does not have any smokeless tobacco history on file. She reports that she drinks alcohol. She reports that she uses drugs.    Allergies  Allergies   Allergen Reactions   • Compazine        Medications  Prior to Admission Medications   Prescriptions Last Dose Informant Patient Reported? Taking?   asa/apap/caffeine (EXCEDRIN) 250-250-65 MG TABS PRN  at unknown  Yes No   Sig: Take 1 Tab by mouth every 6 hours as needed. Indications: Headache   hydrochlorothiazide (HYDRODIURIL) 25 MG TABS 3/31/2014 at 0700  Yes No   Sig: Take 25 mg by mouth every day.   hydrocodone/acetaminophen (NORCO)  MG TABS   No No   Sig: Take 0.5-1 Tabs by mouth every four hours as needed ((Pain Scale 4-6)).   lamotrigine (LAMICTAL) 100 MG TABS 3/30/2014 at 1900  Yes No   Sig: Take 100 mg by mouth 2 Times a Day.   nitroglycerin (NITROSTAT) 0.4 MG SUBL PRN at unknown  Yes No   Sig: Place 0.4 mg under tongue every 5 minutes as needed. Indications: for atypical chest pain   propranolol (INDERAL) 20 MG TABS 3/31/2014 at Unknown  Yes No   Sig: Take 20 mg by mouth every day.   temazepam (RESTORIL) 15 MG CAPS PRN at unknown  Yes No   Sig: Take 15 mg by mouth at bedtime as needed.   trazodone (DESYREL) 100 MG TABS 3/30/2014 at 2100  Yes No   Sig: Take 100 mg by mouth every evening.   venlafaxine (EFFEXOR) 50 MG tablet 3/31/2014 at 0700  Yes No   Sig: Take 150 mg by mouth 3 times a day.      Facility-Administered Medications: None        Physical Exam  Blood Pressure: 119/77   Temperature: 36.2 °C (97.1 °F)   Pulse: 85   Respiration: 17   Pulse Oximetry: 96 %     Physical Exam   Constitutional: She is oriented to person, place, and time. She appears well-developed and well-nourished. No distress.   Obese   HENT:   Head: Normocephalic and atraumatic.   Mouth/Throat: Oropharynx is clear and moist.   Eyes: Pupils are equal, round, and reactive to light. Conjunctivae are normal.   Neck: Neck supple. No thyromegaly present.   Cardiovascular: Normal rate, regular rhythm and normal heart sounds.    Pulmonary/Chest: Effort normal and breath sounds normal. No respiratory distress. She has no wheezes. She has no rales.   Abdominal: Soft. Bowel sounds are normal. She exhibits no distension. There is no tenderness. There is no rebound.   Musculoskeletal: Normal range of motion. She exhibits no edema or tenderness.   Neurological: She is alert and oriented to person, place, and time. No cranial nerve deficit. Coordination normal.   Skin: Skin is warm and dry.   Psychiatric: She has a normal mood and affect. Her behavior is normal.   Nursing note and vitals reviewed.      Laboratory:  Recent Labs      08/30/18 2241 08/31/18   0340   WBC  9.2  8.1   RBC  5.19  4.77   HEMOGLOBIN  15.6  14.6   HEMATOCRIT  47.1*  43.1   MCV  90.8  90.4   MCH  30.1  30.6   MCHC  33.1*  33.9   RDW  46.3  46.1   PLATELETCT  226  229   MPV  10.5  10.7     Recent Labs      08/30/18 2240 08/31/18   0340   SODIUM  133*  135  135   POTASSIUM  3.5*  3.6  3.6   CHLORIDE  99  103  103   CO2  28  27  27   GLUCOSE  107*  101*  101*   BUN  12  10  10   CREATININE  0.79  0.67  0.67   CALCIUM  8.8  8.5  8.5     Recent Labs      08/30/18 2240  08/31/18   0340   ALTSGPT  24  24   ASTSGOT  31  63*   ALKPHOSPHAT  65  60   TBILIRUBIN  0.3  0.3   LIPASE  112*   --    GLUCOSE  107*  101*  101*     Recent Labs      08/30/18 2241   APTT  30.0   INR  0.95     Recent Labs       08/30/18   2241   BNPBTYPENAT  25         Lab Results   Component Value Date    TROPONINI 11.06 (H) 08/30/2018       Urinalysis:    No results found     Imaging:  DX-CHEST-PORTABLE (1 VIEW)   Final Result      1.  Minimal bilateral lower lung atelectasis.   2.  No pneumonia or pneumothorax.      ECHOCARDIOGRAM COMP W/O CONT    (Results Pending)         Assessment/Plan:  I anticipate this patient will require at least two midnights for appropriate medical management, necessitating inpatient admission.    ST elevation myocardial infarction involving right coronary artery (HCC)- (present on admission)   Assessment & Plan    Status post thrombolytics, PCI and stent placement  Patient has been started on aspirin, Effient, metoprolol 25 twice daily and Lipitor 80  Continuous cardiac monitoring   Check 2D echo  Check lipid panel, TSH and hemoglobin A1c  Nitro and morphine when necessary for chest pain          Hepatitis C- (present on admission)   Assessment & Plan    Continue outpatient management        Tobacco abuse- (present on admission)   Assessment & Plan    Tobacco cessation education provided for more than 3 minutes, discussed options of nicotine patch, medical treatment with wellbutrin and chantix. Discussed the risks of smoking including increased risk of heart disease, stroke, cancer and COPD. Discussed the benefits of quitting smoking. Nicotine replacement protocol will be provided to the patient.          Bipolar 1 disorder, manic, moderate (HCC)- (present on admission)   Assessment & Plan    Continue trazodone, Lamictal and Effexor            VTE prophylaxis: SCD

## 2018-08-31 NOTE — PROGRESS NOTES
2 RN admission skin check complete with SEBASTIÁN Bhatia. Skin is grossly intact and free of breakdown.

## 2018-08-31 NOTE — PROGRESS NOTES
12 Hour chart/2RN skin check completed.    MS:  Patient normal sinus 70s-80s. BP stable without support. O2 saturations > 94% on RA to 2LNC.\    0.16/0.08/0.40

## 2018-08-31 NOTE — PROCEDURES
REFERRING PHYSICIAN: Dr. Silveira    PREOPERATIVE DIAGNOSIS:  1.  Inferior STEMI status post thrombotic therapy  2.  Bipolar disorder  3.  Hypertension    POSTOPERATIVE DIAGNOSIS:  1.  99% focal proximal RCA stenosis, culprit  2.  Mild nonobstructive CAD of the  3.  LVEF 65% preintervention  4.  Successful PCI culprit RCA (3.5 x 23 mm Synergy DAMEON), excellent result      PROCEDURE PERFORMED:  Selective coronary angiography of the native vessels  Left heart catheterization  Left ventriculogram  PCI of RCA DAMEON    DESCRIPTION OF PROCEDURE:  The risks and benefits of cardiac catheterization as well as the procedure itself, rationale and appropriateness were discussed with the patient today. Complications including but not limited to death, stroke, MI, urgent bypass surgery, contrast nephropathy, vascular complications, bleeding and infection were explained to the patient. The potential outcomes associated with the procedure (possible PCI, possible CABG, possible medical Rx only) were also discussed at length. The patient agreed to proceed.    The patient was transported to the catheterization laboratory in th emergent fashion. The right radial area and right groin were prepped and draped in the usual fashion. Right radial area was entered with a single through and through puncture under direct ultrasound guidance and a 6F glide sheath was placed. An intra-arterial cocktail of heparin, verapamil and nitroglycerine was administered. Over a wire, a 6 Lithuanian JL 3.5 diagnostic catheter was passed to the aortic root and used to engage the left coronaries without difficulty.  This was then exchanged for a JR4 6 Lithuanian guiding catheter used across the aortic valve contrast was administered 80 cc/s for 24 cc for left ventriculogram and pullback gradient was noted.  It was then used to engage the RCA and contrast was administered.    DESCRIPTION OF PCI:  The decision was made to intervene on the culprit coronary artery.  Bivalirudin  bolus and infusion was initiated.  Through the existing guide a BMW 0.014 floppy tip wire was navigated across the culprit stenosis. A 3.0 x 15 mm compliant balloon was used to predilate the lesion. A 3.5 x 23 mm Synergy DAMEON was then positioned and deployed at nominal pressure. Following this a the stenting balloon was used to post dilate the stent to high pressures. There was an excellent angiographic result with ROLF-3 flow and no residual stenosis in the stented segment.  All catheters and guidewires were removed and a TR band was applied to achieve patent hemostasis. Patient left the cath lab in stable condition.      Moderate sedation directly monitored by me during the case while supervising the administration of the sedation medication by an independent trained RN to assist in the monitoring of the patient's level of consciousness and physiological status. I, the supervising physician was present the entire time from beginning of medication administration until the end of the procedure from 10:51 PM until 11:21 PM. For detailed administration records please see the moderate sedation documentation in the median tab.      FINDINGS:  I. HEMODYNAMICS:    Ao: 123/84 mmHg   LEDP: 23 mmHg   Gradient on LV pullback: No    II. LEFT VENTRICULOGRAM:   LVEF SUN PROJECTION: 65 %     III. CORONARY ANGIOGRAPHY:  Left Main: Moderate caliber trifurcating no CAD  Left Anterior Descending: Moderate caliber mild nonobstructive disease.  Usual complement of diagonals.  Left Circumflex: Moderate caliber nondominant there is a high obtuse marginal/ramus intermedius.  Mild nonobstructive disease.  Right Coronary: Large dominant supplying the posterior lateral posterior descending.  99.9% focal proximal stenosis thrombotic consistent with plaque rupture and the culprit lesion.  Postintervention there is 0% residual stenosis and ROLF-3 flow.    COMPLICATIONS: none apparent    CONCLUSIONS:  1.  99% focal proximal RCA stenosis,  culprit  2.  Mild nonobstructive CAD of the  3.  LVEF 65% preintervention  4.  Successful PCI culprit RCA (3.5 x 23 mm Synergy DAMEON), excellent result

## 2018-08-31 NOTE — CARE PLAN
Problem: Safety  Goal: Will remain free from injury  Outcome: PROGRESSING AS EXPECTED  PT calls for assistance appropriately; steady gait; instructed on nonskid socks and safety precautions    Problem: Respiratory:  Goal: Respiratory status will improve  Outcome: PROGRESSING AS EXPECTED  PT sating well on RA. Wearing 2LNC at night.

## 2018-08-31 NOTE — ASSESSMENT & PLAN NOTE
Status post thrombolytics at outside hospital followed by stent to the RCA on 8/31 and subsequent ICU admission.   Dual antiplatelet therapy with aspirin and Effient  Metoprolol 25 mg twice daily and Lipitor 80 mg daily  Continuous telemetry monitoring   Echocardiogram reveals a normal ejection fraction.  Lipid panel pending  Troponin went up to 72  Nitro and morphine when necessary for chest pain

## 2018-08-31 NOTE — PROGRESS NOTES
Met with patient for medication reconciliation.  Medications updated as appropriate and inpatient orders adjusted per [facility] Medication Reconciliation Protocol.    Thank you,  Elif Hackett, PharmD

## 2018-08-31 NOTE — CARE PLAN
Problem: Infection  Goal: Will remain free from infection  Outcome: PROGRESSING AS EXPECTED  Patient will remain free from infection. CHG bath give upon arrival. Frequent interdisciplinary assessments completed to assess the necessity for invasive therapies such as IVs.     Problem: Venous Thromboembolism (VTW)/Deep Vein Thrombosis (DVT) Prevention:  Goal: Patient will participate in Venous Thrombosis (VTE)/Deep Vein Thrombosis (DVT)Prevention Measures  Outcome: PROGRESSING AS EXPECTED  Patient has risk factors for VTE. Currently refusing mechanical prophylaxis despite education. Patient has multiple anticoagulative agents on board. Patient ambulating multiple times per shift. Will frequently reassess for s/s of VTE.

## 2018-08-31 NOTE — DISCHARGE PLANNING
STEMI Response    Referral: STEMI Response    Intervention: SW responded to a STEMI.  Pt was BIB [facility] after possible STEMI.  Pt was alert upon arrival.  Pts name is Stephanie Olvera (: 1970).  SVEN obtained the following pt information: SW spoke with the pt and she provided SW with an emergency contact of Kinza Garsia (daughter)  [phone]. The pt stated that medical staff is allowed to give her daughter information on the pt.   SVEN was able to contact pts daughter Kinza and she advised SW that she would be coming to [facility] tomorrow morning.  Kinza can be contact at [phone]. SVEN also provided Kinza with SW contact information if needed.     Plan: SW will remain available for pt and family support.

## 2018-09-01 LAB
ANION GAP SERPL CALC-SCNC: 7 MMOL/L (ref 0–11.9)
BASOPHILS # BLD AUTO: 0.9 % (ref 0–1.8)
BASOPHILS # BLD: 0.05 K/UL (ref 0–0.12)
BUN SERPL-MCNC: 8 MG/DL (ref 8–22)
CALCIUM SERPL-MCNC: 8.7 MG/DL (ref 8.5–10.5)
CHLORIDE SERPL-SCNC: 107 MMOL/L (ref 96–112)
CHOLEST SERPL-MCNC: 139 MG/DL (ref 100–199)
CO2 SERPL-SCNC: 25 MMOL/L (ref 20–33)
CREAT SERPL-MCNC: 0.65 MG/DL (ref 0.5–1.4)
EOSINOPHIL # BLD AUTO: 0.1 K/UL (ref 0–0.51)
EOSINOPHIL NFR BLD: 1.9 % (ref 0–6.9)
ERYTHROCYTE [DISTWIDTH] IN BLOOD BY AUTOMATED COUNT: 47.3 FL (ref 35.9–50)
EST. AVERAGE GLUCOSE BLD GHB EST-MCNC: 111 MG/DL
GLUCOSE SERPL-MCNC: 95 MG/DL (ref 65–99)
HBA1C MFR BLD: 5.5 % (ref 0–5.6)
HCT VFR BLD AUTO: 44.9 % (ref 37–47)
HDLC SERPL-MCNC: 32 MG/DL
HGB BLD-MCNC: 14.3 G/DL (ref 12–16)
IMM GRANULOCYTES # BLD AUTO: 0.01 K/UL (ref 0–0.11)
IMM GRANULOCYTES NFR BLD AUTO: 0.2 % (ref 0–0.9)
LDLC SERPL CALC-MCNC: 60 MG/DL
LYMPHOCYTES # BLD AUTO: 1.79 K/UL (ref 1–4.8)
LYMPHOCYTES NFR BLD: 33.4 % (ref 22–41)
MCH RBC QN AUTO: 29.1 PG (ref 27–33)
MCHC RBC AUTO-ENTMCNC: 31.8 G/DL (ref 33.6–35)
MCV RBC AUTO: 91.3 FL (ref 81.4–97.8)
MONOCYTES # BLD AUTO: 0.51 K/UL (ref 0–0.85)
MONOCYTES NFR BLD AUTO: 9.5 % (ref 0–13.4)
NEUTROPHILS # BLD AUTO: 2.9 K/UL (ref 2–7.15)
NEUTROPHILS NFR BLD: 54.1 % (ref 44–72)
NRBC # BLD AUTO: 0 K/UL
NRBC BLD-RTO: 0 /100 WBC
PLATELET # BLD AUTO: 201 K/UL (ref 164–446)
PMV BLD AUTO: 10.5 FL (ref 9–12.9)
POTASSIUM SERPL-SCNC: 3.8 MMOL/L (ref 3.6–5.5)
RBC # BLD AUTO: 4.92 M/UL (ref 4.2–5.4)
SODIUM SERPL-SCNC: 139 MMOL/L (ref 135–145)
TRIGL SERPL-MCNC: 236 MG/DL (ref 0–149)
TROPONIN I SERPL-MCNC: 6.23 NG/ML (ref 0–0.04)
TSH SERPL DL<=0.005 MIU/L-ACNC: 3.42 UIU/ML (ref 0.38–5.33)
WBC # BLD AUTO: 5.4 K/UL (ref 4.8–10.8)

## 2018-09-01 PROCEDURE — 84484 ASSAY OF TROPONIN QUANT: CPT

## 2018-09-01 PROCEDURE — 700102 HCHG RX REV CODE 250 W/ 637 OVERRIDE(OP): Performed by: HOSPITALIST

## 2018-09-01 PROCEDURE — 700102 HCHG RX REV CODE 250 W/ 637 OVERRIDE(OP): Performed by: INTERNAL MEDICINE

## 2018-09-01 PROCEDURE — 84443 ASSAY THYROID STIM HORMONE: CPT

## 2018-09-01 PROCEDURE — 80048 BASIC METABOLIC PNL TOTAL CA: CPT

## 2018-09-01 PROCEDURE — 770022 HCHG ROOM/CARE - ICU (200)

## 2018-09-01 PROCEDURE — A9270 NON-COVERED ITEM OR SERVICE: HCPCS | Performed by: INTERNAL MEDICINE

## 2018-09-01 PROCEDURE — 80061 LIPID PANEL: CPT

## 2018-09-01 PROCEDURE — A9270 NON-COVERED ITEM OR SERVICE: HCPCS | Performed by: HOSPITALIST

## 2018-09-01 PROCEDURE — 85025 COMPLETE CBC W/AUTO DIFF WBC: CPT

## 2018-09-01 PROCEDURE — 83036 HEMOGLOBIN GLYCOSYLATED A1C: CPT

## 2018-09-01 PROCEDURE — 99233 SBSQ HOSP IP/OBS HIGH 50: CPT | Performed by: HOSPITALIST

## 2018-09-01 RX ORDER — NICOTINE 21 MG/24HR
21 PATCH, TRANSDERMAL 24 HOURS TRANSDERMAL
Status: DISCONTINUED | OUTPATIENT
Start: 2018-09-01 | End: 2018-09-02 | Stop reason: HOSPADM

## 2018-09-01 RX ORDER — OMEPRAZOLE 20 MG/1
20 CAPSULE, DELAYED RELEASE ORAL DAILY
COMMUNITY
End: 2019-12-03

## 2018-09-01 RX ADMIN — NICOTINE 21 MG: 21 PATCH, EXTENDED RELEASE TRANSDERMAL at 12:58

## 2018-09-01 RX ADMIN — METOPROLOL TARTRATE 25 MG: 25 TABLET, FILM COATED ORAL at 16:56

## 2018-09-01 RX ADMIN — ASPIRIN 81 MG: 81 TABLET, DELAYED RELEASE ORAL at 05:18

## 2018-09-01 RX ADMIN — VENLAFAXINE HYDROCHLORIDE 150 MG: 75 CAPSULE, EXTENDED RELEASE ORAL at 20:35

## 2018-09-01 RX ADMIN — METOPROLOL TARTRATE 25 MG: 25 TABLET, FILM COATED ORAL at 05:18

## 2018-09-01 RX ADMIN — LAMOTRIGINE 100 MG: 100 TABLET ORAL at 05:18

## 2018-09-01 RX ADMIN — PRASUGREL 10 MG: 10 TABLET, FILM COATED ORAL at 05:19

## 2018-09-01 RX ADMIN — Medication 1 PACKET: at 09:00

## 2018-09-01 RX ADMIN — Medication 1 PACKET: at 18:00

## 2018-09-01 RX ADMIN — LAMOTRIGINE 100 MG: 100 TABLET ORAL at 16:56

## 2018-09-01 RX ADMIN — TRAZODONE HYDROCHLORIDE 200 MG: 50 TABLET ORAL at 20:35

## 2018-09-01 RX ADMIN — ATORVASTATIN CALCIUM 80 MG: 80 TABLET, FILM COATED ORAL at 16:56

## 2018-09-01 ASSESSMENT — PAIN SCALES - GENERAL
PAINLEVEL_OUTOF10: 0

## 2018-09-01 ASSESSMENT — ENCOUNTER SYMPTOMS
SHORTNESS OF BREATH: 0
EYES NEGATIVE: 1
HEARTBURN: 0
MUSCULOSKELETAL NEGATIVE: 1
PND: 0
BRUISES/BLEEDS EASILY: 0
LOSS OF CONSCIOUSNESS: 0
WHEEZING: 0
FEVER: 0
COUGH: 0
NAUSEA: 0
MEMORY LOSS: 0
DIZZINESS: 0
CONSTITUTIONAL NEGATIVE: 1
CHILLS: 0
PALPITATIONS: 0
DEPRESSION: 0

## 2018-09-01 NOTE — CARE PLAN
Problem: Safety  Goal: Will remain free from falls  Outcome: PROGRESSING AS EXPECTED  Bed locked in lowest position and call light is within reach; patient calls appropriately.

## 2018-09-01 NOTE — PROGRESS NOTES
Monitor Summary:    Rate SR 70-90    GA: 0.12  QRS: 0.10  QT: 0.34      Chart/skin check complete.

## 2018-09-01 NOTE — PROGRESS NOTES
Monitor Summary:    Rate SR 80s    CT: 0.16  QRS: 0.08  QT: 0.32      Chart/skin check complete.

## 2018-09-01 NOTE — PROGRESS NOTES
"[facility] Hospitalist Progress Note    Date of Service: 2018    Chief Complaint  48 y.o. female admitted 2018 with chest pain.     Interval Problem Update  Ms. Andrade has a history of bipolar disorder and tobacco use that developed severe substernal chest pain thus presented to the Hospital in [place] and was found to have STEMI on EKG.  She had been given lytic therapy prior to transfer and underwent stenting to the RCA on  with ICU admission.   This morning she feels \"tired\" with slight chest pain  Consultants/Specialty  Cardiology     Disposition  tele        Review of Systems   Constitutional: Negative for chills and fever.   Respiratory: Negative for cough and shortness of breath.    Cardiovascular: Negative for chest pain and leg swelling.   Genitourinary:        \"very heavy\" menstrual flow.   All other systems reviewed and are negative.     Physical Exam  Laboratory/Imaging   Hemodynamics  Temp (24hrs), Av.6 °C (97.9 °F), Min:36.3 °C (97.3 °F), Max:37.1 °C (98.8 °F)   Temperature: 36.9 °C (98.5 °F)  Pulse  Av.1  Min: 70  Max: 97 Heart Rate (Monitored): 78  NIBP: 108/67      Respiratory      Respiration: 15, Pulse Oximetry: 96 %             Fluids    Intake/Output Summary (Last 24 hours) at 18 0702  Last data filed at 18 0600   Gross per 24 hour   Intake             1575 ml   Output             1000 ml   Net              575 ml       Nutrition  Orders Placed This Encounter   Procedures   • Diet Order Cardiac     Standing Status:   Standing     Number of Occurrences:   1     Order Specific Question:   Diet:     Answer:   Cardiac [6]     Physical Exam   Constitutional: She is oriented to person, place, and time. No distress.   Neck: Normal range of motion. Neck supple.   Cardiovascular: Normal rate and regular rhythm.    No murmur heard.  Pulmonary/Chest: Effort normal and breath sounds normal. No respiratory distress.   Abdominal: Soft. She exhibits no distension. "   Musculoskeletal: She exhibits no edema or tenderness.   Neurological: She is alert and oriented to person, place, and time.   Skin: Skin is warm and dry. She is not diaphoretic.   bruise chest and left forearm.   Nursing note and vitals reviewed.      Recent Labs      08/30/18 2241 08/31/18 0340 09/01/18   0530   WBC  9.2  8.1  5.4   RBC  5.19  4.77  4.92   HEMOGLOBIN  15.6  14.6  14.3   HEMATOCRIT  47.1*  43.1  44.9   MCV  90.8  90.4  91.3   MCH  30.1  30.6  29.1   MCHC  33.1*  33.9  31.8*   RDW  46.3  46.1  47.3   PLATELETCT  226  229  201   MPV  10.5  10.7  10.5     Recent Labs      08/30/18 2240 08/31/18   0340  09/01/18   0530   SODIUM  133*  135  135  139   POTASSIUM  3.5*  3.6  3.6  3.8   CHLORIDE  99  103  103  107   CO2  28  27  27  25   GLUCOSE  107*  101*  101*  95   BUN  12  10  10  8   CREATININE  0.79  0.67  0.67  0.65   CALCIUM  8.8  8.5  8.5  8.7     Recent Labs      08/30/18 2241   APTT  30.0   INR  0.95     Recent Labs      08/30/18 2241   BNPBTYPENAT  25     Recent Labs      09/01/18   0530   TRIGLYCERIDE  236*   HDL  32*   LDL  60          Assessment/Plan     ST elevation myocardial infarction involving right coronary artery (HCC)- (present on admission)   Assessment & Plan    Status post thrombolytics at outside hospital followed by stent to the RCA on 8/31 and subsequent ICU admission.   Dual antiplatelet therapy with aspirin and Effient  Metoprolol 25 mg twice daily and Lipitor 80 mg daily  Continuous telemetry monitoring   Echocardiogram reveals a normal ejection fraction.  Lipid panel pending  Troponin went up to 72  Nitro and morphine when necessary for chest pain          Obesity (BMI 30-39.9)- (present on admission)   Assessment & Plan    BMI is 33  Weight loss encouraged.        Hepatitis C- (present on admission)   Assessment & Plan    History of        Tobacco abuse- (present on admission)   Assessment & Plan    Tobacco cessation discussed.  Nicotine patch  added          Bipolar 1 disorder, manic, moderate (HCC)- (present on admission)   Assessment & Plan    Continue outpatient trazodone, Lamictal and Effexor          Quality-Core Measures   Reviewed items::  Labs reviewed and Medications reviewed  DVT prophylaxis pharmacological::  Enoxaparin (Lovenox)

## 2018-09-01 NOTE — PROGRESS NOTES
Cardiology Progress Note               Author: Lolis Su Date & Time created: 9/1/2018  5:01 AM     Interval History:    No events on telemetry  Quiet after intervention  Mild achy chest pain that is constant this morning, different, 0.5 out of 10    Chief Complaint:  STEMI/IMI from [facility].  Drug-eluting stent to the culprit proximal RCA  No significant residual disease    Review of Systems   Constitutional: Negative.    HENT: Negative for hearing loss and tinnitus.    Eyes: Negative.    Respiratory: Negative for cough, shortness of breath and wheezing.    Cardiovascular: Negative for chest pain, palpitations, leg swelling and PND.   Gastrointestinal: Negative for heartburn and nausea.   Musculoskeletal: Negative.    Skin: Negative for rash.   Neurological: Negative for dizziness and loss of consciousness.   Endo/Heme/Allergies: Does not bruise/bleed easily.   Psychiatric/Behavioral: Negative for depression and memory loss.   All other systems reviewed and are negative.      Physical Exam   Constitutional: She is oriented to person, place, and time. She appears well-nourished.   Obese, resting - somnolent but rousable   HENT:   Head: Normocephalic and atraumatic.   Eyes: Pupils are equal, round, and reactive to light. EOM are normal. No scleral icterus.   Neck: Neck supple. No JVD present. No thyromegaly present.   Cardiovascular: Normal rate, regular rhythm and intact distal pulses.  Exam reveals no friction rub.    No murmur heard.  Radial bilat 2/2 - no hematoma   Pulmonary/Chest: Breath sounds normal. No respiratory distress. She exhibits no tenderness.   Abdominal: Soft. Bowel sounds are normal. She exhibits no distension.   Musculoskeletal: She exhibits no edema or tenderness.   Lymphadenopathy:     She has no cervical adenopathy.   Neurological: She is alert and oriented to person, place, and time. She exhibits normal muscle tone. Coordination normal.   Skin: Skin is warm and dry. No rash  noted.   Psychiatric: She has a normal mood and affect. Her behavior is normal.       Hemodynamics:  Temp (24hrs), Av.6 °C (97.8 °F), Min:36.3 °C (97.3 °F), Max:37.1 °C (98.8 °F)  Temperature: 37 °C (98.6 °F)  Pulse  Av.3  Min: 70  Max: 97Heart Rate (Monitored): 74  NIBP: 114/74     Respiratory:    Respiration: 16, Pulse Oximetry: 96 %           Fluids:  Date 18 0700 - 18 0659   Shift 8822-5493 9716-9219 5797-9026 24 Hour Total   I  N  T  A  K  E   I.V. 375   375    Shift Total 375   375   O  U  T  P  U  T   Urine 200   200    Shift Total 200   200   Weight (kg) 103.9 103.9 103.9 103.9       Weight: 104.5 kg (230 lb 6.1 oz)  GI/Nutrition:  Orders Placed This Encounter   Procedures   • Diet Order Cardiac     Standing Status:   Standing     Number of Occurrences:   1     Order Specific Question:   Diet:     Answer:   Cardiac [6]     Lab Results:  Recent Labs      18   0340   WBC  9.2  8.1   RBC  5.19  4.77   HEMOGLOBIN  15.6  14.6   HEMATOCRIT  47.1*  43.1   MCV  90.8  90.4   MCH  30.1  30.6   MCHC  33.1*  33.9   RDW  46.3  46.1   PLATELETCT  226  229   MPV  10.5  10.7     Recent Labs      18   0340   SODIUM  133*  135  135   POTASSIUM  3.5*  3.6  3.6   CHLORIDE  99  103  103   CO2  28  27  27   GLUCOSE  107*  101*  101*   BUN  12  10  10   CREATININE  0.79  0.67  0.67   CALCIUM  8.8  8.5  8.5     Recent Labs      18   APTT  30.0   INR  0.95     Recent Labs      18   BNPBTYPENAT  25     Recent Labs      18   0340   TROPONINI  11.06*   --   72.24*   BNPBTYPENAT   --   25   --              Medical Decision Making, by Problem:  Active Hospital Problems    Diagnosis   • ST elevation myocardial infarction involving right coronary artery (HCC) [I21.11]   • Tobacco abuse [Z72.0]   • Hepatitis C [B19.20]   • Bipolar 1 disorder, manic, moderate (HCC) [F31.12]       Plan:    48-year-old  woman with psychiatric disease admit in transfer 8/30 for acute MI.  Progressing well.  No concomitant heart block or shock    Coronary disease myocardial infarction  Dual antiplatelet therapy, will check cost before discharge  High-dose Lipitor  Low-dose beta-blocker  Troponin was up to 72 - check again (not done yesterday)  Echo normal & reassuring, EF 60 no valve disease    Chest pain  Resolved   Reassurance     Okay to ambulate shower  Tele orders in (I put in today)  We will follow, discussed with nursing  Plan home tomorrow  Cigarette  smoking discussed    Quality-Core Measures

## 2018-09-01 NOTE — CARE PLAN
Problem: Bowel/Gastric:  Goal: Normal bowel function is maintained or improved  Outcome: MET Date Met: 09/01/18  Pt taking metamucil; BM today; will continue on regimen. Tolerating Cardiac diet.    Problem: Respiratory:  Goal: Respiratory status will improve  Outcome: PROGRESSING AS EXPECTED  PT remains on RA. Sating 95-98%.

## 2018-09-02 ENCOUNTER — PATIENT OUTREACH (OUTPATIENT)
Dept: HEALTH INFORMATION MANAGEMENT | Facility: OTHER | Age: 48
End: 2018-09-02

## 2018-09-02 VITALS
RESPIRATION RATE: 13 BRPM | BODY MASS INDEX: 32.39 KG/M2 | TEMPERATURE: 97.2 F | HEART RATE: 97 BPM | WEIGHT: 218.7 LBS | OXYGEN SATURATION: 99 % | DIASTOLIC BLOOD PRESSURE: 77 MMHG | HEIGHT: 69 IN | SYSTOLIC BLOOD PRESSURE: 119 MMHG

## 2018-09-02 PROCEDURE — 99239 HOSP IP/OBS DSCHRG MGMT >30: CPT | Performed by: HOSPITALIST

## 2018-09-02 PROCEDURE — 700102 HCHG RX REV CODE 250 W/ 637 OVERRIDE(OP): Performed by: INTERNAL MEDICINE

## 2018-09-02 PROCEDURE — A9270 NON-COVERED ITEM OR SERVICE: HCPCS | Performed by: INTERNAL MEDICINE

## 2018-09-02 PROCEDURE — 700102 HCHG RX REV CODE 250 W/ 637 OVERRIDE(OP): Performed by: HOSPITALIST

## 2018-09-02 PROCEDURE — A9270 NON-COVERED ITEM OR SERVICE: HCPCS | Performed by: HOSPITALIST

## 2018-09-02 RX ORDER — ATORVASTATIN CALCIUM 40 MG/1
40 TABLET, FILM COATED ORAL DAILY
Qty: 30 TAB | Refills: 3 | Status: SHIPPED | OUTPATIENT
Start: 2018-09-02 | End: 2018-09-28 | Stop reason: SDUPTHER

## 2018-09-02 RX ORDER — ASPIRIN 81 MG/1
81 TABLET ORAL DAILY
Qty: 30 TAB | Refills: 11 | Status: SHIPPED | OUTPATIENT
Start: 2018-09-03 | End: 2021-06-03

## 2018-09-02 RX ORDER — PRASUGREL 10 MG/1
10 TABLET, FILM COATED ORAL DAILY
Qty: 30 TAB | Refills: 11 | Status: SHIPPED | OUTPATIENT
Start: 2018-09-03 | End: 2018-09-02

## 2018-09-02 RX ORDER — TRAZODONE HYDROCHLORIDE 100 MG/1
200 TABLET ORAL
Qty: 30 TAB | Refills: 0 | Status: SHIPPED | OUTPATIENT
Start: 2018-09-02

## 2018-09-02 RX ORDER — PRASUGREL 10 MG/1
10 TABLET, FILM COATED ORAL DAILY
Qty: 30 TAB | Refills: 11 | Status: SHIPPED | OUTPATIENT
Start: 2018-09-03 | End: 2019-12-03

## 2018-09-02 RX ADMIN — LAMOTRIGINE 100 MG: 100 TABLET ORAL at 05:11

## 2018-09-02 RX ADMIN — PRASUGREL 10 MG: 10 TABLET, FILM COATED ORAL at 05:10

## 2018-09-02 RX ADMIN — ASPIRIN 81 MG: 81 TABLET, DELAYED RELEASE ORAL at 05:11

## 2018-09-02 RX ADMIN — METOPROLOL TARTRATE 25 MG: 25 TABLET, FILM COATED ORAL at 05:10

## 2018-09-02 RX ADMIN — NICOTINE 21 MG: 21 PATCH, EXTENDED RELEASE TRANSDERMAL at 05:08

## 2018-09-02 RX ADMIN — Medication 1 PACKET: at 05:12

## 2018-09-02 ASSESSMENT — PAIN SCALES - GENERAL
PAINLEVEL_OUTOF10: 0

## 2018-09-02 ASSESSMENT — ENCOUNTER SYMPTOMS
MUSCULOSKELETAL NEGATIVE: 1
NAUSEA: 0
LOSS OF CONSCIOUSNESS: 0
ORTHOPNEA: 0
SHORTNESS OF BREATH: 0
HEARTBURN: 0
BRUISES/BLEEDS EASILY: 0
CONSTITUTIONAL NEGATIVE: 1
MEMORY LOSS: 0
EYES NEGATIVE: 1
PND: 0
WHEEZING: 0
COUGH: 0
DIZZINESS: 0
PALPITATIONS: 0
DEPRESSION: 0

## 2018-09-02 NOTE — DISCHARGE INSTRUCTIONS
Coronary Angiogram With Stent  Coronary angiogram with stent placement is a procedure to widen or open a narrow blood vessel of the heart (coronary artery). Arteries may become blocked by cholesterol buildup (plaques) in the lining or wall. When a coronary artery becomes partially blocked, blood flow to that area decreases. This may lead to chest pain or a heart attack (myocardial infarction).  A stent is a small piece of metal that looks like mesh or a spring. Stent placement may be done as treatment for a heart attack or right after a coronary angiogram in which a blocked artery is found.  Let your health care provider know about:  · Any allergies you have.  · All medicines you are taking, including vitamins, herbs, eye drops, creams, and over-the-counter medicines.  · Any problems you or family members have had with anesthetic medicines.  · Any blood disorders you have.  · Any surgeries you have had.  · Any medical conditions you have.  · Whether you are pregnant or may be pregnant.  What are the risks?  Generally, this is a safe procedure. However, problems may occur, including:  · Damage to the heart or its blood vessels.  · A return of blockage.  · Bleeding, infection, or bruising at the insertion site.  · A collection of blood under the skin (hematoma) at the insertion site.  · A blood clot in another part of the body.  · Kidney injury.  · Allergic reaction to the dye or contrast that is used.  · Bleeding into the abdomen (retroperitoneal bleeding).  What happens before the procedure?  Staying hydrated   Follow instructions from your health care provider about hydration, which may include:  · Up to 2 hours before the procedure - you may continue to drink clear liquids, such as water, clear fruit juice, black coffee, and plain tea.  Eating and drinking restrictions   Follow instructions from your health care provider about eating and drinking, which may include:  · 8 hours before the procedure - stop eating  heavy meals or foods such as meat, fried foods, or fatty foods.  · 6 hours before the procedure - stop eating light meals or foods, such as toast or cereal.  · 2 hours before the procedure - stop drinking clear liquids.  Ask your health care provider about:  · Changing or stopping your regular medicines. This is especially important if you are taking diabetes medicines or blood thinners.  · Taking medicines such as ibuprofen. These medicines can thin your blood. Do not take these medicines before your procedure if your health care provider instructs you not to. Generally, aspirin is recommended before a procedure of passing a small, thin tube (catheter) through a blood vessel and into the heart (cardiac catheterization).  What happens during the procedure?  · An IV tube will be inserted into one of your veins.  · You will be given one or more of the following:  ¨ A medicine to help you relax (sedative).  ¨ A medicine to numb the area where the catheter will be inserted into an artery (local anesthetic).  · To reduce your risk of infection:  ¨ Your health care team will wash or sanitize their hands.  ¨ Your skin will be washed with soap.  ¨ Hair may be removed from the area where the catheter will be inserted.  · Using a guide wire, the catheter will be inserted into an artery. The location may be in your groin, in your wrist, or in the fold of your arm (near your elbow).  · A type of X-ray (fluoroscopy) will be used to help guide the catheter to the opening of the arteries in the heart.  · A dye will be injected into the catheter, and X-rays will be taken. The dye will help to show where any narrowing or blockages are located in the arteries.  · A tiny wire will be guided to the blocked spot, and a balloon will be inflated to make the artery wider.  · The stent will be expanded and will crush the plaques into the wall of the vessel. The stent will hold the area open and improve the blood flow. Most stents have a  drug coating to reduce the risk of the stent narrowing over time.  · The artery may be made wider using a drill, laser, or other tools to remove plaques.  · When the blood flow is better, the catheter will be removed. The lining of the artery will grow over the stent, which stays where it was placed.  This procedure may vary among health care providers and hospitals.  What happens after the procedure?  · If the procedure is done through the leg, you will be kept in bed lying flat for about 6 hours. You will be instructed to not bend and not cross your legs.  · The insertion site will be checked frequently.  · The pulse in your foot or wrist will be checked frequently.  · You may have additional blood tests, X-rays, and a test that records the electrical activity of your heart (electrocardiogram, or ECG).  This information is not intended to replace advice given to you by your health care provider. Make sure you discuss any questions you have with your health care provider.  Document Released: 06/23/2004 Document Revised: 08/17/2017 Document Reviewed: 07/23/2017  [employer] Interactive Patient Education © 2017 [employer]  Acute Coronary Syndrome  Acute coronary syndrome (ACS) is a serious problem in which there is suddenly not enough blood and oxygen supplied to the heart. ACS may mean that one or more of the blood vessels in your heart (coronary arteries) may be blocked. ACS can result in chest pain or a heart attack (myocardial infarction or MI).  What are the causes?  This condition is caused by atherosclerosis, which is the buildup of fat and cholesterol (plaque) on the inside of the arteries. Over time, the plaque may narrow or block the artery, and this will lessen blood flow to the heart. Plaque can also become weak and break off within a coronary artery to form a clot and cause a sudden blockage.  What increases the risk?  The risk factors of this condition include:  · High cholesterol levels.  · High blood  pressure (hypertension).  · Smoking.  · Diabetes.  · Age.  · Family history of chest pain, heart disease, or stroke.  · Lack of exercise.  What are the signs or symptoms?  The most common signs of this condition include:  · Chest pain, which can be:  ¨ A crushing or squeezing in the chest.  ¨ A tightness, pressure, fullness, or heaviness in the chest.  ¨ Present for more than a few minutes, or it can stop and recur.  · Pain in the arms, neck, jaw, or back.  · Unexplained heartburn or indigestion.  · Shortness of breath.  · Nausea.  · Sudden cold sweats.  · Feeling light-headed or dizzy.  Sometimes, this condition has no symptoms.  How is this diagnosed?  ACS may be diagnosed through the following tests:  · Electrocardiogram (ECG).  · Blood tests.  · Coronary angiogram. This is a procedure to look at the coronary arteries to see if there is any blockage.  How is this treated?  Treatment for ACS may include:  · Healthy behavioral changes to reduce or control risk factors.  · Medicine.  · Coronary stenting. A stent helps to keep an artery open.  · Coronary angioplasty. This procedure widens a narrowed or blocked artery.  · Coronary artery bypass surgery. This will allow your blood to pass the blockage (bypass) to reach your heart.  Follow these instructions at home:  Eating and drinking  · Follow a heart-healthy diet. A dietitian can you help to educate you about healthy food options and changes.  · Use healthy cooking methods such as roasting, grilling, broiling, baking, poaching, steaming, or stir-frying. Talk to a dietitian to learn more about healthy cooking methods.  Medicines  · Take medicines only as directed by your health care provider.  · Do not take the following medicines unless your health care provider approves:  ¨ Nonsteroidal anti-inflammatory drugs (NSAIDs), such as ibuprofen, naproxen, or celecoxib.  ¨ Vitamin supplements that contain vitamin A, vitamin E, or both.  ¨ Hormone replacement therapy that  contains estrogen with or without progestin.  · Stop illegal drug use.  Activity  · Follow an exercise program that is approved by your health care provider.  · Plan rest periods when you are fatigued.  Lifestyle  · Do not use any tobacco products, including cigarettes, chewing tobacco, or electronic cigarettes. If you need help quitting, ask your health care provider.  · If you drink alcohol, and your health care provider approves, limit your alcohol intake to no more than 1 drink per day. One drink equals 12 ounces of beer, 5 ounces of wine, or 1½ ounces of hard liquor.  · Learn to manage stress.  · Maintain a healthy weight. Lose weight as approved by your health care provider.  General instructions  · Manage other health conditions, such as hypertension and diabetes, as directed by your health care provider.  · Keep all follow-up visits as directed by your health care provider. This is important.  · Your health care provider may ask you to monitor your blood pressure. A blood pressure reading consists of a higher number over a lower number, such as 110 over 72, written as 110/72. Ideally, your blood pressure should be:  ¨ Below 140/90 if you have no other medical conditions.  ¨ Below 130/80 if you have diabetes or kidney disease.  Get help right away if:  · You have pain in your chest, neck, arm, jaw, stomach, or back that lasts more than a few minutes, is recurring, or is not relieved by taking medicine under your tongue (sublingual nitroglycerin).  · You have profuse sweating without cause.  · You have unexplained:  ¨ Heartburn or indigestion.  ¨ Shortness of breath or difficulty breathing.  ¨ Nausea or vomiting.  ¨ Fatigue.  ¨ Feelings of nervousness or anxiety.  ¨ Weakness.  ¨ Diarrhea.  · You have sudden light-headedness or dizziness.  · You faint.  These symptoms may represent a serious problem that is an emergency. Do not wait to see if the symptoms will go away. Get medical help right away. Call your  local emergency services (911 in the [place] Do not drive yourself to the clinic or hospital.   This information is not intended to replace advice given to you by your health care provider. Make sure you discuss any questions you have with your health care provider.  Document Released: 12/18/2006 Document Revised: 05/31/2017 Document Reviewed: 04/21/2015  [employer] Interactive Patient Education © 2017 [employer]  Discharge Instructions    Discharged to home by car with relative. Discharged via wheelchair, hospital escort: Yes.  Special equipment needed: Not Applicable    Be sure to schedule a follow-up appointment with your primary care doctor or any specialists as instructed.     Discharge Plan:   Smoking Cessation Offered: Patient Refused  Pneumococcal Vaccine Administered/Refused: Not given - Patient refused pneumococcal vaccine  Influenza Vaccine Indication: Not indicated: Previously immunized this influenza season and > 8 years of age    I understand that a diet low in cholesterol, fat, and sodium is recommended for good health. Unless I have been given specific instructions below for another diet, I accept this instruction as my diet prescription.   Other diet: Cardiac      Special Instructions: Diagnosis:  Acute Coronary Syndrome (ACS) is a diagnosis that encompasses cardiac-related chest pain and heart attack. ACS occurs when the blood flow to the heart muscle is severely reduced or cut off completely due to a slow process called atherosclerosis.  Atherosclerosis is a disease in which the coronary arteries become narrow from a buildup of fat, cholesterol, and other substances that combine to form plaque. If the plaque breaks, a blood clot will form and block the blood flow to the heart muscle. This lack of blood flow can cause damage or death to the heart muscle which is called a heart attack or Myocardial Infarction (MI). There are two different types of MIs:  ST Elevation Myocardial Infarction or STEMI  (the most severe type of heart attack) and Non-ST Elevation Myocardial Infarction or NSTEMI.    Treatment Plan:  · Cardiac Diet  - Low fat, low salt, low cholesterol   · Cardiac Rehab  - Your doctor has ordered you a referral to [facility].  Call [phone] to schedule an appointment.  · Attend my follow-up appointment with my Cardiologist.  · Take my medications as prescribed by my doctor  · Exercise daily  · Quit Smoking, Lower my bad cholesterol and raise my good cholesterol and Reduce stress    Medications:  Certain medications are used to treat ACS.  Remember to always take medications as prescribed and never stop talking medications unless told by your doctor.    You have been prescribed the following medicatons:    Aspirin - Aspirin is used as a blood thinning medication and you will require this medication indefinitely.  Anti-platelet/blood thinner - Your Anti-platelet/Blood thinning medication is called Prasugrel (Effient), and is used in combination with aspirin to prevent clots from forming in your heart and/or around your stent.  Your doctor will determine how long you need to be on this medicine.  Beta-Blocker - Beta-Blocker Metoprolol is used to lower blood pressure and heart rate, and/or helps your heart heal after a heart attack.  Statin - Statin Atorvastatin (Lipitor) is used to lower cholesterol.    · Is patient discharged on Warfarin / Coumadin?   No     Depression / Suicide Risk    As you are discharged from this [facility] facility, it is important to learn how to keep safe from harming yourself.    Recognize the warning signs:  · Abrupt changes in personality, positive or negative- including increase in energy   · Giving away possessions  · Change in eating patterns- significant weight changes-  positive or negative  · Change in sleeping patterns- unable to sleep or sleeping all the time   · Unwillingness or inability to communicate  · Depression  · Unusual sadness, discouragement and  loneliness  · Talk of wanting to die  · Neglect of personal appearance   · Rebelliousness- reckless behavior  · Withdrawal from people/activities they love  · Confusion- inability to concentrate     If you or a loved one observes any of these behaviors or has concerns about self-harm, here's what you can do:  · Talk about it- your feelings and reasons for harming yourself  · Remove any means that you might use to hurt yourself (examples: pills, rope, extension cords, firearm)  · Get professional help from the community (Mental Health, Substance Abuse, psychological counseling)  · Do not be alone:Call your Safe Contact- someone whom you trust who will be there for you.  · Call your local CRISIS HOTLINE 626-3112 or 049-928-9606  · Call your local Children's Mobile Crisis Response Team [place] (479) 551-3899 or www.[employer]  · Call the toll free National Suicide Prevention Hotlines   · National Suicide Prevention Lifeline 021-763-DSAY (7111)  · National Hope Line Network 800-SUICIDE (088-7462)

## 2018-09-02 NOTE — PROGRESS NOTES
Pt OKd to be discharged. Scripts sent to pharmacy;  verified new effient prescription and insurance coverage. Pt to be discharged w/ relative.     IV removed.    AVS reviewed w/ patient.

## 2018-09-02 NOTE — PROGRESS NOTES
Cardiology Progress Note               Author: Lolis Su Date & Time created: 9/2/2018  9:51 AM     Interval History:    No events on telemetry  Ambulating in halls    Chief Complaint:  STEMI/IMI from [facility].  Drug-eluting stent to the culprit proximal RCA  No significant residual disease    Review of Systems   Constitutional: Negative.    HENT: Negative for hearing loss and tinnitus.    Eyes: Negative.    Respiratory: Negative for cough, shortness of breath and wheezing.    Cardiovascular: Negative for chest pain, palpitations, orthopnea, leg swelling and PND.   Gastrointestinal: Negative for heartburn and nausea.   Musculoskeletal: Negative.    Skin: Negative for rash.   Neurological: Negative for dizziness and loss of consciousness.   Endo/Heme/Allergies: Does not bruise/bleed easily.   Psychiatric/Behavioral: Negative for depression and memory loss.   All other systems reviewed and are negative.      Physical Exam   Constitutional: She is oriented to person, place, and time. She appears well-nourished.   Obese, resting - somnolent but rousable   HENT:   Head: Normocephalic and atraumatic.   Eyes: Pupils are equal, round, and reactive to light. EOM are normal. No scleral icterus.   Neck: Neck supple. No JVD present. No thyromegaly present.   Cardiovascular: Normal rate, regular rhythm and intact distal pulses.  Exam reveals no friction rub.    No murmur heard.  Radial bilat 2/2 - no hematoma   Pulmonary/Chest: Breath sounds normal. No respiratory distress. She exhibits no tenderness.   Abdominal: Soft. Bowel sounds are normal. She exhibits no distension.   Musculoskeletal: She exhibits no edema or tenderness.   Lymphadenopathy:     She has no cervical adenopathy.   Neurological: She is alert and oriented to person, place, and time. She exhibits normal muscle tone. Coordination normal.   Skin: Skin is warm and dry. No rash noted.   Psychiatric: She has a normal mood and affect. Her behavior is  normal.       Hemodynamics:  Temp (24hrs), Av.4 °C (97.5 °F), Min:36.2 °C (97.1 °F), Max:36.6 °C (97.9 °F)  Temperature: 36.4 °C (97.5 °F)  Pulse  Av.1  Min: 70  Max: 97Heart Rate (Monitored): 75  NIBP: 119/81     Respiratory:    Respiration: 15, Pulse Oximetry: 99 %           Fluids:  Date 18 07 - 18 0659   Shift 2811-2745 5640-6112 1771-2046 24 Hour Total   I  N  T  A  K  E   I.V. 375   375    Shift Total 375   375   O  U  T  P  U  T   Urine 200   200    Shift Total 200   200   Weight (kg) 103.9 103.9 103.9 103.9       Weight: 99.2 kg (218 lb 11.1 oz)  GI/Nutrition:  Orders Placed This Encounter   Procedures   • Diet Order Cardiac     Standing Status:   Standing     Number of Occurrences:   1     Order Specific Question:   Diet:     Answer:   Cardiac [6]     Lab Results:  Recent Labs      18   0530   WBC  9.2  8.1  5.4   RBC  5.19  4.77  4.92   HEMOGLOBIN  15.6  14.6  14.3   HEMATOCRIT  47.1*  43.1  44.9   MCV  90.8  90.4  91.3   MCH  30.1  30.6  29.1   MCHC  33.1*  33.9  31.8*   RDW  46.3  46.1  47.3   PLATELETCT  226  229  201   MPV  10.5  10.7  10.5     Recent Labs      18   0530   SODIUM  133*  135  135  139   POTASSIUM  3.5*  3.6  3.6  3.8   CHLORIDE  99  103  103  107   CO2  28  27  27  25   GLUCOSE  107*  101*  101*  95   BUN  12  10  10  8   CREATININE  0.79  0.67  0.67  0.65   CALCIUM  8.8  8.5  8.5  8.7     Recent Labs      18   APTT  30.0   INR  0.95     Recent Labs      18   BNPBTYPENAT  25     Recent Labs      180  18   0530   TROPONINI  11.06*   --   72.24*  6.23*   BNPBTYPENAT   --   25   --    --      Recent Labs      18   0530   TRIGLYCERIDE  236*   HDL  32*   LDL  60         Medical Decision Making, by Problem:  Active Hospital Problems    Diagnosis   • ST elevation myocardial infarction involving right  coronary artery (HCC) [I21.11]   • Tobacco abuse [Z72.0]   • Hepatitis C [B19.20]   • Bipolar 1 disorder, manic, moderate (HCC) [F31.12]       Plan:    48-year-old woman with psychiatric disease admit in transfer 8/30 for acute MI.  Progressing well.  No concomitant heart block or shock    Coronary disease myocardial infarction  Dual antiplatelet therapy, will check cost before discharge  High-dose Lipitor  Low-dose beta-blocker  Troponin peaked 72   Echo normal & reassuring, EF 60 no valve disease    Chest pain  Resolved   Reassurance     Bipolar  Asked me for trazadone  Reoriented  D/w Dr Cortez    Plan home today, f/u arranged in our clinic - we have also helped wthall f her prescriptions  Cigarette  smoking discussed    Quality-Core Measures

## 2018-09-02 NOTE — DISCHARGE PLANNING
Anticipated Discharge Disposition: Home    Action: LSW contacted  in [place]. Pharmacy has rx. APRN e-script to pharmacy. SW intern contacted pharmacy, copay is $3.85. APRN notified.    Barriers to Discharge: None    Plan: No additional discharge needs.

## 2018-09-02 NOTE — DISCHARGE SUMMARY
Discharge Summary    CHIEF COMPLAINT ON ADMISSION  No chief complaint on file.      Reason for Admission  STEMI    Admission Date  8/30/2018    CODE STATUS  Full Code    HPI & HOSPITAL COURSE  This is a 48 y.o. female here with chest pain.     Ms. Andrade has a history of bipolar disorder and tobacco use that developed severe substernal chest pain thus presented to the Hospital in [place] and was found to have STEMI on EKG.  She had been given lytic therapy prior to transfer and underwent stenting to the RCA on 8/31 with ICU admission. She had an echo that revealed a normal EF of 60%. She did not have any reperfusion arrhythmias. Today she is tolerating a cardiac diet and ambulating unassisted on room air.    Ms. Hanks usually has high blood pressure and is on HTCZ and Norvasc at home but, after heart catheterization, her BP has been normal thus her home regimen has been held. She had been on propranolol as an outpatient and it has been transitioned to metoprolol.   The pharmacy in [place] is closed today and tomorrow thus her prescriptions were sent to the [facility] in [place] with refills.     Therefore, she is discharged in good and stable condition to home with close outpatient follow-up.    The patient met 2-midnight criteria for an inpatient stay at the time of discharge.    Discharge Date  9/2/18      DISCHARGE DIAGNOSES  Active Problems:    ST elevation myocardial infarction involving right coronary artery (HCC) POA: Yes    Bipolar 1 disorder, manic, moderate (HCC) POA: Yes    Tobacco abuse POA: Yes    Hepatitis C POA: Yes    Obesity (BMI 30-39.9) POA: Yes  Resolved Problems:    * No resolved hospital problems. *      FOLLOW UP  Future Appointments  Date Time Provider Department Center   9/28/2018 11:00 AM MARILIN Valenzuela [facility] None     Marcelo Martinez M.D.  [address] [place] [id number] [phone]      [facility]  unable to call office due to weekend and holiday.  Please call to schedule your appointment . Thank you       MEDICATIONS ON DISCHARGE     Medication List      START taking these medications      Instructions   aspirin 81 MG EC tablet   Take 1 Tab by mouth every day.  Dose:  81 mg     atorvastatin 40 MG Tabs  Commonly known as:  LIPITOR   Take 1 Tab by mouth every day.  Dose:  40 mg     metoprolol 25 MG Tabs  Commonly known as:  LOPRESSOR   Take 1 Tab by mouth 2 Times a Day.  Dose:  25 mg     prasugrel 10 MG Tabs  Commonly known as:  EFFIENT   Take 1 Tab by mouth every day.  Dose:  10 mg        CHANGE how you take these medications      Instructions   traZODone 100 MG Tabs  What changed:  · when to take this  · reasons to take this  Commonly known as:  DESYREL   Take 2 Tabs by mouth at bedtime as needed for Sleep.  Dose:  200 mg        CONTINUE taking these medications      Instructions   asa/apap/caffeine 250-250-65 MG Tabs  Commonly known as:  EXCEDRIN   Take 1 Tab by mouth every 6 hours as needed. Indications: Headache  Dose:  1 Tab     lamoTRIgine 100 MG Tabs  Commonly known as:  LAMICTAL   Take 100 mg by mouth 2 Times a Day.  Dose:  100 mg     nitroglycerin 0.4 MG Subl  Commonly known as:  NITROSTAT   Place 0.4 mg under tongue every 5 minutes as needed. Indications: for atypical chest pain  Dose:  0.4 mg     omeprazole 20 MG delayed-release capsule  Commonly known as:  PRILOSEC   Take 40 mg by mouth every day.  Dose:  40 mg     psyllium 58.12 % Pack  Commonly known as:  METAMUCIL   Take 1 Packet by mouth 2 Times a Day. Orange flavor  Dose:  1 Packet     temazepam 15 MG Caps  Commonly known as:  RESTORIL   Take 15 mg by mouth at bedtime as needed.  Dose:  15 mg     venlafaxine XR 75 MG Cp24  Commonly known as:  EFFEXOR XR   Take 150 mg by mouth every bedtime.  Dose:  150 mg        STOP taking these medications    amLODIPine 10 MG Tabs  Commonly known as:  NORVASC     hydroCHLOROthiazide 25 MG Tabs  Commonly known as:  HYDRODIURIL     propranolol 20 MG  Tabs  Commonly known as:  INDERAL            Allergies  Allergies   Allergen Reactions   • Compazine        DIET  Orders Placed This Encounter   Procedures   • Diet Order Cardiac     Standing Status:   Standing     Number of Occurrences:   1     Order Specific Question:   Diet:     Answer:   Cardiac [6]       ACTIVITY  We discussed emphasis on a plant-based diet.  Smoking cessation discussed.     CONSULTATIONS  Cardiology     PROCEDURES  Heart cath 8/30:    POSTOPERATIVE DIAGNOSIS:  1.  99% focal proximal RCA stenosis, culprit  2.  Mild nonobstructive CAD of the  3.  LVEF 65% preintervention  4.  Successful PCI culprit RCA (3.5 x 23 mm Synergy DAMEON), excellent result  LABORATORY  Lab Results   Component Value Date    SODIUM 139 09/01/2018    POTASSIUM 3.8 09/01/2018    CHLORIDE 107 09/01/2018    CO2 25 09/01/2018    GLUCOSE 95 09/01/2018    BUN 8 09/01/2018    CREATININE 0.65 09/01/2018        Lab Results   Component Value Date    WBC 5.4 09/01/2018    HEMOGLOBIN 14.3 09/01/2018    HEMATOCRIT 44.9 09/01/2018    PLATELETCT 201 09/01/2018    Echocardiogram:  Left Ventricle  Normal left ventricular chamber size. Normal left ventricular wall   thickness. Normal left ventricular systolic function. Left ventricular   ejection fraction is visually estimated to be 60%. Normal diastolic   Function.    HDL 32  LDL 60  Glycohemoglobin of 5.5  TSH 3.4    Total time of the discharge process exceeds 35 minutes.

## 2018-09-02 NOTE — DISCHARGE PLANNING
Anticipated Discharge Disposition: Home    Action: LSW called [facility] pharmacy to check copay amount/prior auth. [facility] pharmacy closed until Tuesday due to holiday. APRN notified. APRN to escript to [facility] in [place].     Barriers to Discharge: Medication    Plan: LSW to call [facility] for copay/prior auth

## 2018-09-02 NOTE — CARE PLAN
Problem: Safety  Goal: Will remain free from injury  Outcome: PROGRESSING AS EXPECTED  Patient is up-self with no assistance required.    Problem: Knowledge Deficit  Goal: Knowledge of disease process/condition, treatment plan, diagnostic tests, and medications will improve  Outcome: PROGRESSING AS EXPECTED  Completed smoking cessation education and education on Prasugrel ([employer]).

## 2018-09-28 ENCOUNTER — OFFICE VISIT (OUTPATIENT)
Dept: CARDIOLOGY | Facility: MEDICAL CENTER | Age: 48
End: 2018-09-28
Payer: MEDICARE

## 2018-09-28 VITALS
HEIGHT: 69 IN | BODY MASS INDEX: 32.73 KG/M2 | DIASTOLIC BLOOD PRESSURE: 80 MMHG | WEIGHT: 221 LBS | SYSTOLIC BLOOD PRESSURE: 132 MMHG | HEART RATE: 96 BPM | OXYGEN SATURATION: 94 %

## 2018-09-28 DIAGNOSIS — Z72.0 TOBACCO ABUSE: ICD-10-CM

## 2018-09-28 DIAGNOSIS — Z95.5 S/P RIGHT CORONARY ARTERY (RCA) STENT PLACEMENT: ICD-10-CM

## 2018-09-28 DIAGNOSIS — I25.10 CORONARY ARTERY DISEASE INVOLVING NATIVE CORONARY ARTERY OF NATIVE HEART WITHOUT ANGINA PECTORIS: ICD-10-CM

## 2018-09-28 PROCEDURE — 99214 OFFICE O/P EST MOD 30 MIN: CPT | Performed by: NURSE PRACTITIONER

## 2018-09-28 RX ORDER — ATORVASTATIN CALCIUM 40 MG/1
40 TABLET, FILM COATED ORAL DAILY
Qty: 30 TAB | Refills: 11 | Status: SHIPPED | OUTPATIENT
Start: 2018-09-28 | End: 2019-12-03 | Stop reason: SDUPTHER

## 2018-09-28 ASSESSMENT — ENCOUNTER SYMPTOMS
SHORTNESS OF BREATH: 0
NAUSEA: 0
DIZZINESS: 0
WHEEZING: 0
PND: 0
WEAKNESS: 0
HEMOPTYSIS: 0
ORTHOPNEA: 0
NERVOUS/ANXIOUS: 1
SPUTUM PRODUCTION: 0
VOMITING: 0
COUGH: 0
PALPITATIONS: 0
DEPRESSION: 1
CLAUDICATION: 0
BRUISES/BLEEDS EASILY: 1

## 2018-09-28 NOTE — PROGRESS NOTES
No chief complaint on file.      Subjective:   Stephanie Andrade is a 48 y.o. female who presents today for hospital follow up STEMI.    She is new to our clinic, HX: bipolar 1 disorder, tobacco abuse, and hepatitis C.    She was hospitalized for STEMI on EKG 8/31/18.  She was taken for cardiac catheterization which showed 99% focal proximal RCA stenosis and a successful PCI to the RCA.    Patient is doing well, she denies any chest pain, but does have SOB during exertion due to deconditioning.    She is tolerating her medications well and compliant.      She continues to smoke a pack a day and drink 2-3 can of beer a day. She knows she has problem drinking and not willing to given up right now. She is willing to quite smoking.    Past Medical History:   Diagnosis Date   • Behavior problem    • Bipolar 1 disorder, manic, moderate (HCC)    • CAD (coronary artery disease) 08/30/2018   • Chronic pain syndrome    • Drug-seeking behavior    • Foot fracture    • Heart attack (HCC) 08/30/2018   • Hep C w/ coma, chronic ([facility])    • Hypothyroid    • Migraines    • Polycythemia      Past Surgical History:   Procedure Laterality Date   • ANGIOPLASTY  08/30/2018    PIC to RCA 3.5 x 23 mm   • TIBIA NAILING INTRAMEDULLARY  3/31/2014    Performed by Alistair Holt M.D. at SURGERY [facility]   • ANKLE ORIF  3/31/2014    Performed by Alistair Holt M.D. at SURGERY [facility]     Family History   Problem Relation Age of Onset   • Heart Attack Maternal Grandmother 40   • Heart Disease Maternal Grandmother    • Heart Failure Maternal Grandmother      Social History     Social History   • Marital status:      Spouse name: N/A   • Number of children: N/A   • Years of education: N/A     Occupational History   • Not on file.     Social History Main Topics   • Smoking status: Current Every Day Smoker     Packs/day: 1.00     Types: Cigarettes   • Smokeless tobacco: Never Used   • Alcohol use 1.8 oz/week     3 Cans of  beer per week      Comment: weekly   • Drug use: Yes      Comment: marijuana   • Sexual activity: Not on file     Other Topics Concern   • Not on file     Social History Narrative   • No narrative on file     Allergies   Allergen Reactions   • Compazine      Outpatient Encounter Prescriptions as of 9/28/2018   Medication Sig Dispense Refill   • metoprolol (LOPRESSOR) 25 MG Tab Take 1 Tab by mouth 2 Times a Day. 60 Tab 11   • atorvastatin (LIPITOR) 40 MG Tab Take 1 Tab by mouth every day. 30 Tab 11   • prasugrel (EFFIENT) 10 MG Tab Take 1 Tab by mouth every day. 30 Tab 11   • aspirin EC 81 MG EC tablet Take 1 Tab by mouth every day. 30 Tab 11   • omeprazole (PRILOSEC) 20 MG delayed-release capsule Take 40 mg by mouth every day.     • venlafaxine XR (EFFEXOR XR) 75 MG CAPSULE SR 24 HR Take 150 mg by mouth every bedtime.     • lamotrigine (LAMICTAL) 100 MG TABS Take 100 mg by mouth 2 Times a Day.     • traZODone (DESYREL) 100 MG Tab Take 2 Tabs by mouth at bedtime as needed for Sleep. 30 Tab 0   • [DISCONTINUED] metoprolol (LOPRESSOR) 25 MG Tab Take 1 Tab by mouth 2 Times a Day. 60 Tab 2   • [DISCONTINUED] atorvastatin (LIPITOR) 40 MG Tab Take 1 Tab by mouth every day. 30 Tab 3   • psyllium (METAMUCIL) 58.12 % Pack Take 1 Packet by mouth 2 Times a Day. Orange flavor     • temazepam (RESTORIL) 15 MG CAPS Take 15 mg by mouth at bedtime as needed.     • asa/apap/caffeine (EXCEDRIN) 250-250-65 MG TABS Take 1 Tab by mouth every 6 hours as needed. Indications: Headache     • nitroglycerin (NITROSTAT) 0.4 MG SUBL Place 0.4 mg under tongue every 5 minutes as needed. Indications: for atypical chest pain       No facility-administered encounter medications on file as of 9/28/2018.      Review of Systems   Constitutional: Negative for malaise/fatigue.   Respiratory: Negative for cough, hemoptysis, sputum production, shortness of breath and wheezing.    Cardiovascular: Negative for chest pain, palpitations, orthopnea,  "claudication, leg swelling and PND.   Gastrointestinal: Negative for nausea and vomiting.   Musculoskeletal: Positive for joint pain.   Neurological: Negative for dizziness and weakness.   Endo/Heme/Allergies: Bruises/bleeds easily.   Psychiatric/Behavioral: Positive for depression. The patient is nervous/anxious.         Objective:   /80 (BP Location: Left arm, Patient Position: Sitting, BP Cuff Size: Adult)   Pulse 96   Ht 1.753 m (5' 9\")   Wt 100.2 kg (221 lb)   LMP 08/31/2018   SpO2 94%   BMI 32.64 kg/m²     Physical Exam   Constitutional: She is oriented to person, place, and time. She appears well-developed and well-nourished. No distress.   HENT:   Head: Normocephalic and atraumatic.   Eyes: Pupils are equal, round, and reactive to light.   Neck: No JVD present.   Cardiovascular: Normal rate, regular rhythm and normal heart sounds.    Pulmonary/Chest: Effort normal and breath sounds normal.   Abdominal: Soft. Bowel sounds are normal. She exhibits no distension.   Musculoskeletal: She exhibits no edema.   Neurological: She is alert and oriented to person, place, and time.   Skin: Skin is warm and dry. She is not diaphoretic.   Psychiatric: She has a normal mood and affect. Her behavior is normal. Judgment and thought content normal.       Cardiac catheterization  8/30/18  1.  99% focal proximal RCA stenosis, culprit  2.  Mild nonobstructive CAD of the  3.  LVEF 65% preintervention  4.  Successful PCI culprit RCA (3.5 x 23 mm Synergy ADMEON), excellent result      Echocardiography Laboratory  8/31/18  No prior study is available for comparison.   Normal left ventricular systolic function.   No evidence of valvular abnormality based on Doppler evaluation.     Lab Results   Component Value Date/Time    CHOLSTRLTOT 139 09/01/2018 05:30 AM    LDL 60 09/01/2018 05:30 AM    HDL 32 (A) 09/01/2018 05:30 AM    TRIGLYCERIDE 236 (H) 09/01/2018 05:30 AM       Lab Results   Component Value Date/Time    SODIUM 139 " 09/01/2018 05:30 AM    POTASSIUM 3.8 09/01/2018 05:30 AM    CHLORIDE 107 09/01/2018 05:30 AM    CO2 25 09/01/2018 05:30 AM    GLUCOSE 95 09/01/2018 05:30 AM    BUN 8 09/01/2018 05:30 AM    CREATININE 0.65 09/01/2018 05:30 AM     Lab Results   Component Value Date/Time    ALKPHOSPHAT 60 08/31/2018 03:40 AM    ASTSGOT 63 (H) 08/31/2018 03:40 AM    ALTSGPT 24 08/31/2018 03:40 AM    TBILIRUBIN 0.3 08/31/2018 03:40 AM          Assessment:     1. Coronary artery disease involving native coronary artery of native heart without angina pectoris  metoprolol (LOPRESSOR) 25 MG Tab    atorvastatin (LIPITOR) 40 MG Tab    REFERRAL TO TOBACCO CESSATION PROGRAM   2. S/P right coronary artery (RCA) stent placement  metoprolol (LOPRESSOR) 25 MG Tab    atorvastatin (LIPITOR) 40 MG Tab   3. Tobacco abuse  REFERRAL TO TOBACCO CESSATION PROGRAM       Medical Decision Making:  Today's Assessment / Status / Plan:     1. CAD:  - Pt recovering well post stent placement.  She has not had any recurrence of chest pain or angina.  - She has been compliant with her medications without missed doses.  I discussed mechanism of action and importance of cardiac medications, especially DAPT.    - We reviewed heart healthy, low sodium low cholesterol diet today.  We also reviewed recommended activity guidelines per AHA guidelines.  - patient lives in [place] unable to participate in cardiac rehab. She will start walking 10 minutes a day, with a goal of 30 minutes a day three times a week   - dicussed no NSAID while on DAPT therapy, only acetaminophen and patient agrees.   - continue to take aspirin 81 mg, atorvastatin 40 mg daily, Effient 10 mg daily, and metoprolol 25 mg twice daily  - ECHO post PCI ef 60%    2. Tobacco abuse:  - ready to quite  - smoking cessation program referral     Follow up in 3 months, sooner as needed.     Collaborating Provider: Dr. Pack

## 2018-09-28 NOTE — LETTER
[facility]   [address], [address]  [place] [address]  Phone: [phone]  Fax: [phone]              Stephanie Andrade  1970    Encounter Date: 9/28/2018    MARILIN Valenzuela          PROGRESS NOTE:  No chief complaint on file.      Subjective:   Stephanie Andrade is a 48 y.o. female who presents today for hospital follow up STEMI.    She is new to our clinic, HX: bipolar 1 disorder, tobacco abuse, and hepatitis C.    She was hospitalized for STEMI on EKG 8/31/18.  She was taken for cardiac catheterization which showed 99% focal proximal RCA stenosis and a successful PCI to the RCA.    Patient is doing well, she denies any chest pain, but does have SOB during exertion due to deconditioning.    She is tolerating her medications well and compliant.      She continues to smoke a pack a day and drink 2-3 can of beer a day. She knows she has problem drinking and not willing to given up right now. She is willing to quite smoking.    Past Medical History:   Diagnosis Date   • Behavior problem    • Bipolar 1 disorder, manic, moderate (HCC)    • CAD (coronary artery disease) 08/30/2018   • Chronic pain syndrome    • Drug-seeking behavior    • Foot fracture    • Heart attack (HCC) 08/30/2018   • Hep C w/ coma, chronic (HCC)    • Hypothyroid    • Migraines    • Polycythemia      Past Surgical History:   Procedure Laterality Date   • ANGIOPLASTY  08/30/2018    PIC to RCA 3.5 x 23 mm   • TIBIA NAILING INTRAMEDULLARY  3/31/2014    Performed by Alistair Holt M.D. at SURGERY [facility]   • ANKLE ORIF  3/31/2014    Performed by Alistair Holt M.D. at SURGERY [facility]     Family History   Problem Relation Age of Onset   • Heart Attack Maternal Grandmother 40   • Heart Disease Maternal Grandmother    • Heart Failure Maternal Grandmother      Social History     Social History   • Marital status:      Spouse name: N/A   • Number of  children: N/A   • Years of education: N/A     Occupational History   • Not on file.     Social History Main Topics   • Smoking status: Current Every Day Smoker     Packs/day: 1.00     Types: Cigarettes   • Smokeless tobacco: Never Used   • Alcohol use 1.8 oz/week     3 Cans of beer per week      Comment: weekly   • Drug use: Yes      Comment: marijuana   • Sexual activity: Not on file     Other Topics Concern   • Not on file     Social History Narrative   • No narrative on file     Allergies   Allergen Reactions   • Compazine      Outpatient Encounter Prescriptions as of 9/28/2018   Medication Sig Dispense Refill   • metoprolol (LOPRESSOR) 25 MG Tab Take 1 Tab by mouth 2 Times a Day. 60 Tab 11   • atorvastatin (LIPITOR) 40 MG Tab Take 1 Tab by mouth every day. 30 Tab 11   • prasugrel (EFFIENT) 10 MG Tab Take 1 Tab by mouth every day. 30 Tab 11   • aspirin EC 81 MG EC tablet Take 1 Tab by mouth every day. 30 Tab 11   • omeprazole (PRILOSEC) 20 MG delayed-release capsule Take 40 mg by mouth every day.     • venlafaxine XR (EFFEXOR XR) 75 MG CAPSULE SR 24 HR Take 150 mg by mouth every bedtime.     • lamotrigine (LAMICTAL) 100 MG TABS Take 100 mg by mouth 2 Times a Day.     • traZODone (DESYREL) 100 MG Tab Take 2 Tabs by mouth at bedtime as needed for Sleep. 30 Tab 0   • [DISCONTINUED] metoprolol (LOPRESSOR) 25 MG Tab Take 1 Tab by mouth 2 Times a Day. 60 Tab 2   • [DISCONTINUED] atorvastatin (LIPITOR) 40 MG Tab Take 1 Tab by mouth every day. 30 Tab 3   • psyllium (METAMUCIL) 58.12 % Pack Take 1 Packet by mouth 2 Times a Day. Orange flavor     • temazepam (RESTORIL) 15 MG CAPS Take 15 mg by mouth at bedtime as needed.     • asa/apap/caffeine (EXCEDRIN) 250-250-65 MG TABS Take 1 Tab by mouth every 6 hours as needed. Indications: Headache     • nitroglycerin (NITROSTAT) 0.4 MG SUBL Place 0.4 mg under tongue every 5 minutes as needed. Indications: for atypical chest pain       No facility-administered encounter medications  "on file as of 9/28/2018.      Review of Systems   Constitutional: Negative for malaise/fatigue.   Respiratory: Negative for cough, hemoptysis, sputum production, shortness of breath and wheezing.    Cardiovascular: Negative for chest pain, palpitations, orthopnea, claudication, leg swelling and PND.   Gastrointestinal: Negative for nausea and vomiting.   Musculoskeletal: Positive for joint pain.   Neurological: Negative for dizziness and weakness.   Endo/Heme/Allergies: Bruises/bleeds easily.   Psychiatric/Behavioral: Positive for depression. The patient is nervous/anxious.         Objective:   /80 (BP Location: Left arm, Patient Position: Sitting, BP Cuff Size: Adult)   Pulse 96   Ht 1.753 m (5' 9\")   Wt 100.2 kg (221 lb)   LMP 08/31/2018   SpO2 94%   BMI 32.64 kg/m²      Physical Exam   Constitutional: She is oriented to person, place, and time. She appears well-developed and well-nourished. No distress.   HENT:   Head: Normocephalic and atraumatic.   Eyes: Pupils are equal, round, and reactive to light.   Neck: No JVD present.   Cardiovascular: Normal rate, regular rhythm and normal heart sounds.    Pulmonary/Chest: Effort normal and breath sounds normal.   Abdominal: Soft. Bowel sounds are normal. She exhibits no distension.   Musculoskeletal: She exhibits no edema.   Neurological: She is alert and oriented to person, place, and time.   Skin: Skin is warm and dry. She is not diaphoretic.   Psychiatric: She has a normal mood and affect. Her behavior is normal. Judgment and thought content normal.       Cardiac catheterization  8/30/18  1.  99% focal proximal RCA stenosis, culprit  2.  Mild nonobstructive CAD of the  3.  LVEF 65% preintervention  4.  Successful PCI culprit RCA (3.5 x 23 mm Synergy DAMEON), excellent result      Echocardiography Laboratory  8/31/18  No prior study is available for comparison.   Normal left ventricular systolic function.   No evidence of valvular abnormality based on " Doppler evaluation.     Lab Results   Component Value Date/Time    CHOLSTRLTOT 139 09/01/2018 05:30 AM    LDL 60 09/01/2018 05:30 AM    HDL 32 (A) 09/01/2018 05:30 AM    TRIGLYCERIDE 236 (H) 09/01/2018 05:30 AM       Lab Results   Component Value Date/Time    SODIUM 139 09/01/2018 05:30 AM    POTASSIUM 3.8 09/01/2018 05:30 AM    CHLORIDE 107 09/01/2018 05:30 AM    CO2 25 09/01/2018 05:30 AM    GLUCOSE 95 09/01/2018 05:30 AM    BUN 8 09/01/2018 05:30 AM    CREATININE 0.65 09/01/2018 05:30 AM     Lab Results   Component Value Date/Time    ALKPHOSPHAT 60 08/31/2018 03:40 AM    ASTSGOT 63 (H) 08/31/2018 03:40 AM    ALTSGPT 24 08/31/2018 03:40 AM    TBILIRUBIN 0.3 08/31/2018 03:40 AM          Assessment:     1. Coronary artery disease involving native coronary artery of native heart without angina pectoris  metoprolol (LOPRESSOR) 25 MG Tab    atorvastatin (LIPITOR) 40 MG Tab    REFERRAL TO TOBACCO CESSATION PROGRAM   2. S/P right coronary artery (RCA) stent placement  metoprolol (LOPRESSOR) 25 MG Tab    atorvastatin (LIPITOR) 40 MG Tab   3. Tobacco abuse  REFERRAL TO TOBACCO CESSATION PROGRAM       Medical Decision Making:  Today's Assessment / Status / Plan:     1. CAD:  - Pt recovering well post stent placement.  She has not had any recurrence of chest pain or angina.  - She has been compliant with her medications without missed doses.  I discussed mechanism of action and importance of cardiac medications, especially DAPT.    - We reviewed heart healthy, low sodium low cholesterol diet today.  We also reviewed recommended activity guidelines per AHA guidelines.  - patient lives in [place] unable to participate in cardiac rehab. She will start walking 10 minutes a day, with a goal of 30 minutes a day three times a week   - dicussed no NSAID while on DAPT therapy, only acetaminophen and patient agrees.   - continue to take aspirin 81 mg, atorvastatin 40 mg daily, Effient 10 mg daily, and metoprolol 25 mg twice daily  -  ECHO post PCI ef 60%    2. Tobacco abuse:  - ready to quite  - smoking cessation program referral     Follow up in 3 months, sooner as needed.     Collaborating Provider: Dr. Sirena Salter, A.PDENISE  [address] [place] [id number]  VIA Facsimile: [phone]

## 2019-12-03 ENCOUNTER — OFFICE VISIT (OUTPATIENT)
Dept: CARDIOLOGY | Facility: MEDICAL CENTER | Age: 49
End: 2019-12-03
Payer: MEDICARE

## 2019-12-03 VITALS
OXYGEN SATURATION: 96 % | DIASTOLIC BLOOD PRESSURE: 90 MMHG | SYSTOLIC BLOOD PRESSURE: 130 MMHG | HEIGHT: 69 IN | WEIGHT: 192 LBS | HEART RATE: 72 BPM | BODY MASS INDEX: 28.44 KG/M2

## 2019-12-03 DIAGNOSIS — R07.89 OTHER CHEST PAIN: ICD-10-CM

## 2019-12-03 DIAGNOSIS — I21.11 ST ELEVATION MYOCARDIAL INFARCTION INVOLVING RIGHT CORONARY ARTERY (HCC): ICD-10-CM

## 2019-12-03 DIAGNOSIS — I25.10 CORONARY ARTERY DISEASE DUE TO LIPID RICH PLAQUE: ICD-10-CM

## 2019-12-03 DIAGNOSIS — I25.10 CORONARY ARTERY DISEASE INVOLVING NATIVE CORONARY ARTERY OF NATIVE HEART WITHOUT ANGINA PECTORIS: ICD-10-CM

## 2019-12-03 DIAGNOSIS — F17.200 TOBACCO USE DISORDER: ICD-10-CM

## 2019-12-03 DIAGNOSIS — Z95.5 S/P RIGHT CORONARY ARTERY (RCA) STENT PLACEMENT: ICD-10-CM

## 2019-12-03 DIAGNOSIS — I25.83 CORONARY ARTERY DISEASE DUE TO LIPID RICH PLAQUE: ICD-10-CM

## 2019-12-03 LAB — EKG IMPRESSION: NORMAL

## 2019-12-03 PROCEDURE — 99406 BEHAV CHNG SMOKING 3-10 MIN: CPT | Mod: 25 | Performed by: INTERNAL MEDICINE

## 2019-12-03 PROCEDURE — 93000 ELECTROCARDIOGRAM COMPLETE: CPT | Performed by: INTERNAL MEDICINE

## 2019-12-03 PROCEDURE — 99214 OFFICE O/P EST MOD 30 MIN: CPT | Mod: 25 | Performed by: INTERNAL MEDICINE

## 2019-12-03 RX ORDER — VENLAFAXINE HYDROCHLORIDE 150 MG/1
150 CAPSULE, EXTENDED RELEASE ORAL DAILY
COMMUNITY

## 2019-12-03 RX ORDER — HYDROCHLOROTHIAZIDE 25 MG/1
25 TABLET ORAL DAILY
COMMUNITY
End: 2019-12-03 | Stop reason: SDUPTHER

## 2019-12-03 RX ORDER — HYDROCHLOROTHIAZIDE 50 MG/1
25 TABLET ORAL DAILY
Qty: 90 TAB | Refills: 3 | Status: SHIPPED | OUTPATIENT
Start: 2019-12-03 | End: 2019-12-06 | Stop reason: SDUPTHER

## 2019-12-03 RX ORDER — OMEPRAZOLE 40 MG/1
40 CAPSULE, DELAYED RELEASE ORAL DAILY
COMMUNITY

## 2019-12-03 RX ORDER — CARVEDILOL 12.5 MG/1
12.5 TABLET ORAL 2 TIMES DAILY WITH MEALS
COMMUNITY
End: 2023-09-06 | Stop reason: SDUPTHER

## 2019-12-03 RX ORDER — ATORVASTATIN CALCIUM 40 MG/1
40 TABLET, FILM COATED ORAL DAILY
Qty: 90 TAB | Refills: 3 | Status: SHIPPED | OUTPATIENT
Start: 2019-12-03 | End: 2020-12-28

## 2019-12-03 RX ORDER — CLOPIDOGREL BISULFATE 75 MG/1
75 TABLET ORAL DAILY
Qty: 90 TAB | Refills: 3 | Status: SHIPPED | OUTPATIENT
Start: 2019-12-03 | End: 2020-11-17

## 2019-12-03 ASSESSMENT — ENCOUNTER SYMPTOMS
FALLS: 0
ABDOMINAL PAIN: 0
PND: 0
NECK PAIN: 1
FEVER: 0
SORE THROAT: 0
COUGH: 1
HEADACHES: 1
DEPRESSION: 1
FOCAL WEAKNESS: 0
CLAUDICATION: 0
CHILLS: 0
HEARTBURN: 1
PALPITATIONS: 0
NAUSEA: 0
SHORTNESS OF BREATH: 0
DIZZINESS: 0
BACK PAIN: 1
BRUISES/BLEEDS EASILY: 1
BLURRED VISION: 0
WEAKNESS: 0

## 2019-12-03 ASSESSMENT — LIFESTYLE VARIABLES: SUBSTANCE_ABUSE: 1

## 2019-12-03 NOTE — PATIENT INSTRUCTIONS
Please look into the following diets and incorporate them into your diet    LOW SALT DIET   KEEP YOUR SODIUM EQUAL TO CALORIES AND NO MORE THAN DOUBLE THE CALORIES FOR A LOW SALT DIET    FOR TREATMENT OR PREVENTION OF CORONARY ARTERY DISEASE    [facility] - [facility]    Dr. Caraballo's Program for Reversing Heart Disease - [facility] Cardiologist    [facility]    Dr Azevedo - [place] [facility] (book and documentary)      FOR TREATMENT OF BLOOD PRESSURE  DASH DIET - American Heart Association for treatment of HYPERTENSION    FOR TREATMENT OF BAD CHOLESTEROL/FATS  REDUCE PROCESSED SUGAR AS MUCH AS POSSIBLE  INCREASE WHOLE GRAINS/VEGETABLES    Lowering total cholesterol and LDL (bad) cholesterol:  - Eat leaner cuts of meat, or eliminate altogether if possible red meat, and frequently substitute fish or chicken.  - Limit saturated fat to no more than 7-10% of total calories no more than 10 g per day is recommended. Some sources of saturated fat include butter, animal fats, hydrogenated vegetable fats and oils, many desserts, whole milk dairy products.  - Replaced saturated fats with polyunsaturated fats and monounsaturated fats. Foods high in monounsaturated fat include nuts, although well, canola oil, avocados, and olives.  - Limit trans fat (processed foods) and replaced with fresh fruits and vegetables  - Recommend nonfat dairy products  - Increase substantially the amount of soluble fiber intake (legumes such as beans, fruit, whole grains).  - Consider nutritional supplements: plant sterile spreads such as Benecol, fish oil,  flaxseed oil, omega-3 acids capsules 1000 mg twice a day, or viscous fiber such as Metamucil  - Attain ideal weight and regular exercise (at least 30 minutes per day of walking)    Lowering triglycerides:  - Reduce intake of simple sugar: Desserts, candy, pastries, honey, sodas, sugared cereals, yogurt, Gatorade, sports bars, canned  fruit, smoothies, fruit juice, coffee drinks  - Reduced intake of refined starches: Refined Pasta  - Reduce or abstain from alcohol  - Increase omega-3 fatty acids: Zenda, Trout, Mackerel, Herring, Albacore tuna and supplements  - Attain ideal weight and regular exercise (at least 30 minutes per day of walking)      Elevating HDL (good) cholesterol:  - Increase physical activity  - Seasoned foods with garlic and onions  - Increase omega-3 fatty acids and supplements as listed above  - Incorporating appropriate amounts of monounsaturated fats such as nuts, olive oil, canola oil, avocados, olives  - Stop smoking  - Attain ideal weight and regular exercise (at least 30 minutes per day of walking)

## 2019-12-03 NOTE — PROGRESS NOTES
Chief Complaint   Patient presents with   • Coronary Artery Disease     follow up/ recent chest pain       Subjective:   Stephanie Hanks is a 49 y.o. female who presents today     Past Medical History:   Diagnosis Date   • Behavior problem    • Bipolar 1 disorder, manic, moderate (HCC)    • CAD (coronary artery disease) 08/30/2018   • Chronic pain syndrome    • Drug-seeking behavior    • Foot fracture    • Heart attack (HCC) 08/30/2018   • Hep C w/ coma, chronic ([facility])    • Hypothyroid    • Migraines    • Polycythemia      Past Surgical History:   Procedure Laterality Date   • ANGIOPLASTY  08/30/2018    PIC to RCA 3.5 x 23 mm   • TIBIA NAILING INTRAMEDULLARY  3/31/2014    Performed by Alistair Holt M.D. at SURGERY [facility]   • ANKLE ORIF  3/31/2014    Performed by Alistair Holt M.D. at SURGERY [facility]     Family History   Problem Relation Age of Onset   • Heart Attack Maternal Grandmother 40   • Heart Disease Maternal Grandmother    • Heart Failure Maternal Grandmother      Social History     Socioeconomic History   • Marital status:      Spouse name: Not on file   • Number of children: Not on file   • Years of education: Not on file   • Highest education level: Not on file   Occupational History   • Not on file   Social Needs   • Financial resource strain: Not on file   • Food insecurity:     Worry: Not on file     Inability: Not on file   • Transportation needs:     Medical: Not on file     Non-medical: Not on file   Tobacco Use   • Smoking status: Current Every Day Smoker     Packs/day: 1.00     Types: Cigarettes   • Smokeless tobacco: Never Used   Substance and Sexual Activity   • Alcohol use: Yes     Alcohol/week: 1.8 oz     Types: 3 Cans of beer per week     Comment: weekly   • Drug use: Yes     Comment: marijuana   • Sexual activity: Not on file   Lifestyle   • Physical activity:     Days per week: Not on file     Minutes per session: Not on file   • Stress: Not on file    Relationships   • Social connections:     Talks on phone: Not on file     Gets together: Not on file     Attends Synagogue service: Not on file     Active member of club or organization: Not on file     Attends meetings of clubs or organizations: Not on file     Relationship status: Not on file   • Intimate partner violence:     Fear of current or ex partner: Not on file     Emotionally abused: Not on file     Physically abused: Not on file     Forced sexual activity: Not on file   Other Topics Concern   • Not on file   Social History Narrative   • Not on file     Allergies   Allergen Reactions   • Compazine      Outpatient Encounter Medications as of 12/3/2019   Medication Sig Dispense Refill   • omeprazole (PRILOSEC) 40 MG delayed-release capsule Take 40 mg by mouth every day.     • venlafaxine (EFFEXOR-XR) 150 MG extended-release capsule Take 150 mg by mouth every day.     • carvedilol (COREG) 12.5 MG Tab Take 12.5 mg by mouth 2 times a day, with meals.     • hydroCHLOROthiazide (HYDRODIURIL) 50 MG Tab Take 0.5 Tabs by mouth every day. 90 Tab 3   • clopidogrel (PLAVIX) 75 MG Tab Take 1 Tab by mouth every day. 90 Tab 3   • atorvastatin (LIPITOR) 40 MG Tab Take 1 Tab by mouth every day. 30 Tab 11   • traZODone (DESYREL) 100 MG Tab Take 2 Tabs by mouth at bedtime as needed for Sleep. 30 Tab 0   • aspirin EC 81 MG EC tablet Take 1 Tab by mouth every day. 30 Tab 11   • psyllium (METAMUCIL) 58.12 % Pack Take 1 Packet by mouth 2 Times a Day. Orange flavor     • asa/apap/caffeine (EXCEDRIN) 250-250-65 MG TABS Take 1 Tab by mouth every 6 hours as needed. Indications: Headache     • nitroglycerin (NITROSTAT) 0.4 MG SUBL Place 0.4 mg under tongue every 5 minutes as needed. Indications: for atypical chest pain     • lamotrigine (LAMICTAL) 100 MG TABS Take 100 mg by mouth 2 Times a Day.     • [DISCONTINUED] hydroCHLOROthiazide (HYDRODIURIL) 25 MG Tab Take 25 mg by mouth every day.     • [DISCONTINUED] metoprolol (LOPRESSOR)  "25 MG Tab Take 1 Tab by mouth 2 Times a Day. (Patient not taking: Reported on 12/3/2019) 60 Tab 11   • [DISCONTINUED] prasugrel (EFFIENT) 10 MG Tab Take 1 Tab by mouth every day. 30 Tab 11   • [DISCONTINUED] omeprazole (PRILOSEC) 20 MG delayed-release capsule Take 20 mg by mouth every day.     • [DISCONTINUED] venlafaxine XR (EFFEXOR XR) 75 MG CAPSULE SR 24 HR Take 150 mg by mouth every bedtime.     • [DISCONTINUED] temazepam (RESTORIL) 15 MG CAPS Take 15 mg by mouth at bedtime as needed.       No facility-administered encounter medications on file as of 12/3/2019.      Review of Systems   Constitutional: Negative for chills and fever.   HENT: Positive for nosebleeds. Negative for sore throat.    Eyes: Negative for blurred vision.   Respiratory: Positive for cough. Negative for shortness of breath.    Cardiovascular: Negative for chest pain, palpitations, claudication, leg swelling and PND.   Gastrointestinal: Positive for heartburn. Negative for abdominal pain and nausea.   Musculoskeletal: Positive for back pain and neck pain. Negative for falls and joint pain.   Skin: Negative for rash.   Neurological: Positive for headaches. Negative for dizziness, focal weakness and weakness.   Endo/Heme/Allergies: Bruises/bleeds easily.   Psychiatric/Behavioral: Positive for depression and substance abuse (past).        Objective:   /90 (BP Location: Left arm, Patient Position: Sitting)   Pulse 72   Ht 1.753 m (5' 9\")   Wt 87.1 kg (192 lb)   SpO2 96%   BMI 28.35 kg/m²     Physical Exam   Constitutional: No distress.   HENT:   Mouth/Throat: Oropharynx is clear and moist. No oropharyngeal exudate.   Eyes: No scleral icterus.   Neck: No JVD present.   Cardiovascular: Normal rate and normal heart sounds. Exam reveals no gallop and no friction rub.   No murmur heard.  Pulmonary/Chest: No respiratory distress. She has no wheezes. She has no rales.   Abdominal: Soft. Bowel sounds are normal.   Musculoskeletal:         " General: No edema.   Neurological: She is alert.   Skin: No rash noted. She is not diaphoretic.   Psychiatric: She has a normal mood and affect.       Reports good labs at [facility] recently    Results for orders placed or performed in visit on 19   EKG   Result Value Ref Range    Report       [facility]    Test Date:  2019  Pt Name:    YOLANDA HANKS              Department: [facility]  MRN:        [id number]                      Room:  Gender:     Female                       Technician: KATLYN  :        1970                   Requested By:BULMARO ZHAO  Order #:    [id number]                    Reading MD:    Measurements  Intervals                                Axis  Rate:       69                           P:          29  MT:         140                          QRS:        21  QRSD:       98                           T:          54  QT:         412  QTc:        442    Interpretive Statements  SINUS RHYTHM  Compared to ECG 2018 03:26:57  Inferior Q waves no longer present  Q waves no longer present         No results found for this or any previous visit.    Assessment:     1. Other chest pain  EKG   2. S/P right coronary artery (RCA) stent placement     3. Coronary artery disease due to lipid rich plaque         Medical Decision Making:  Today's Assessment / Status / Plan:     It was my pleasure to meet with Ms. Hanks.    Recent chest pain does not seem concerning for coronary disease certainly has significant risk but she is monitoring and managing well EKG today was stable no need for stress testing for now continue monitor for symptoms    Blood pressure is well controlled.      She is on appropriate statin.    Increased hctz for DBP labs in a month    We specifically discussed and she understands the importance of dual antiplatelet therapy as well as the risk. Switch to plavix for another 30 months    I spent 5 minutes of face to face counseling on the vital need for  smoking cessation.  We discussed pharmacotherapy measures for quiting including nicotine replacement.      I will see Ms. Hanks back in 1 year time in [place] or [place] and encouraged her to follow up with us over the phone or electronically using my MyChart as issues arise.    It is my pleasure to participate in the care of Ms. Hanks.  Please do not hesitate to contact me with questions or concerns.    Nabor Holden MD PhD [facility]  Cardiologist [facility] and Vascular [facility]    Please note that this dictation was created using voice recognition software. I have worked with consultants from the vendor as well as technical experts from [facility] to optimize the interface. I have made every reasonable attempt to correct obvious errors, but I expect that there are errors of grammar and possibly content I did not discover before finalizing the note.

## 2019-12-05 ENCOUNTER — PATIENT MESSAGE (OUTPATIENT)
Dept: CARDIOLOGY | Facility: MEDICAL CENTER | Age: 49
End: 2019-12-05

## 2019-12-06 RX ORDER — HYDROCHLOROTHIAZIDE 50 MG/1
50 TABLET ORAL DAILY
Qty: 90 TAB | Refills: 3 | Status: SHIPPED | OUTPATIENT
Start: 2019-12-06 | End: 2021-02-18

## 2019-12-07 NOTE — PATIENT COMMUNICATION
Yes sorry defaulted wrong I updated the prescription to 50 mg daily at [facility].    Please let her know    Thank you

## 2020-11-15 DIAGNOSIS — I25.10 CORONARY ARTERY DISEASE DUE TO LIPID RICH PLAQUE: ICD-10-CM

## 2020-11-15 DIAGNOSIS — I25.83 CORONARY ARTERY DISEASE DUE TO LIPID RICH PLAQUE: ICD-10-CM

## 2020-11-15 DIAGNOSIS — Z95.5 S/P RIGHT CORONARY ARTERY (RCA) STENT PLACEMENT: ICD-10-CM

## 2020-11-17 RX ORDER — CLOPIDOGREL BISULFATE 75 MG/1
TABLET ORAL
Qty: 90 TAB | Refills: 1 | Status: SHIPPED | OUTPATIENT
Start: 2020-11-17 | End: 2021-05-24

## 2020-12-27 DIAGNOSIS — I25.10 CORONARY ARTERY DISEASE INVOLVING NATIVE CORONARY ARTERY OF NATIVE HEART WITHOUT ANGINA PECTORIS: ICD-10-CM

## 2020-12-27 DIAGNOSIS — Z95.5 S/P RIGHT CORONARY ARTERY (RCA) STENT PLACEMENT: ICD-10-CM

## 2020-12-28 RX ORDER — ATORVASTATIN CALCIUM 40 MG/1
TABLET, FILM COATED ORAL
Qty: 90 TAB | Refills: 1 | Status: SHIPPED | OUTPATIENT
Start: 2020-12-28 | End: 2021-06-03 | Stop reason: SDUPTHER

## 2021-06-03 ENCOUNTER — TELEMEDICINE (OUTPATIENT)
Dept: CARDIOLOGY | Facility: PHYSICIAN GROUP | Age: 51
End: 2021-06-03
Payer: MEDICARE

## 2021-06-03 VITALS — WEIGHT: 166 LBS | BODY MASS INDEX: 24.59 KG/M2 | HEIGHT: 69 IN

## 2021-06-03 DIAGNOSIS — I10 HYPERTENSION, UNSPECIFIED TYPE: ICD-10-CM

## 2021-06-03 DIAGNOSIS — I25.10 CORONARY ARTERY DISEASE INVOLVING NATIVE CORONARY ARTERY OF NATIVE HEART WITHOUT ANGINA PECTORIS: ICD-10-CM

## 2021-06-03 DIAGNOSIS — I25.83 CORONARY ARTERY DISEASE DUE TO LIPID RICH PLAQUE: ICD-10-CM

## 2021-06-03 DIAGNOSIS — F17.200 TOBACCO USE DISORDER: ICD-10-CM

## 2021-06-03 DIAGNOSIS — Z95.5 S/P RIGHT CORONARY ARTERY (RCA) STENT PLACEMENT: ICD-10-CM

## 2021-06-03 DIAGNOSIS — I25.10 CORONARY ARTERY DISEASE DUE TO LIPID RICH PLAQUE: ICD-10-CM

## 2021-06-03 PROCEDURE — 99214 OFFICE O/P EST MOD 30 MIN: CPT | Mod: 95 | Performed by: INTERNAL MEDICINE

## 2021-06-03 RX ORDER — HYDROCHLOROTHIAZIDE 50 MG/1
50 TABLET ORAL DAILY
Qty: 90 TABLET | Refills: 3 | Status: SHIPPED | OUTPATIENT
Start: 2021-06-03 | End: 2023-09-06 | Stop reason: SDUPTHER

## 2021-06-03 RX ORDER — ATORVASTATIN CALCIUM 40 MG/1
40 TABLET, FILM COATED ORAL DAILY
Qty: 90 TABLET | Refills: 3 | Status: SHIPPED | OUTPATIENT
Start: 2021-06-03 | End: 2023-09-06 | Stop reason: SDUPTHER

## 2021-06-03 RX ORDER — LAMOTRIGINE 150 MG/1
TABLET ORAL
COMMUNITY
Start: 2021-05-17 | End: 2023-09-06

## 2021-06-03 RX ORDER — NITROGLYCERIN 0.4 MG/1
0.4 TABLET SUBLINGUAL PRN
Qty: 25 TABLET | Refills: 11 | Status: SHIPPED | OUTPATIENT
Start: 2021-06-03

## 2021-06-03 RX ORDER — CLOPIDOGREL BISULFATE 75 MG/1
75 TABLET ORAL DAILY
Qty: 90 TABLET | Refills: 3 | Status: SHIPPED | OUTPATIENT
Start: 2021-06-03 | End: 2023-09-06 | Stop reason: SDUPTHER

## 2021-06-03 ASSESSMENT — ENCOUNTER SYMPTOMS
NAUSEA: 0
PND: 0
BLURRED VISION: 0
CHILLS: 0
PALPITATIONS: 0
ABDOMINAL PAIN: 0
SHORTNESS OF BREATH: 0
DIZZINESS: 0
FOCAL WEAKNESS: 0
CLAUDICATION: 0
SORE THROAT: 0
FEVER: 0
COUGH: 0
WEAKNESS: 0
FALLS: 0
BRUISES/BLEEDS EASILY: 0

## 2021-06-03 NOTE — PROGRESS NOTES
Chief Complaint   Patient presents with   • Coronary Artery Disease     F/V Dx: Coronary artery disease due to lipid rich plaque & ST elevation myocardial infarction involving right coronary artery (HCC)       Subjective:   Stephanie Hanks is a 50 y.o. female who presents today for follow-up of her history of coronary disease status post STEMI with stenting 3 years ago now      Remarkable weight loss she is able to get from 220 down to 116 maintain this which is awesome    Reduced smoking     She is likely to need hysterectomy and possible bladder surgery for prolapse    Past Medical History:   Diagnosis Date   • Behavior problem    • Bipolar 1 disorder, manic, moderate ([facility])    • CAD (coronary artery disease) 08/30/2018   • Chronic pain syndrome    • Drug-seeking behavior    • Foot fracture    • Former tobacco use    • Heart attack ([facility]) 08/30/2018   • Hep C w/ coma, chronic    • Hypothyroid    • Migraines    • Polycythemia      Past Surgical History:   Procedure Laterality Date   • ANGIOPLASTY  08/30/2018    PIC to RCA 3.5 x 23 mm   • TIBIA NAILING INTRAMEDULLARY  3/31/2014    Performed by Alistair Holt M.D. at SURGERY [facility]   • ANKLE ORIF  3/31/2014    Performed by Alistair Holt M.D. at SURGERY [facility]     Family History   Problem Relation Age of Onset   • Heart Attack Maternal Grandmother 40   • Heart Disease Maternal Grandmother    • Heart Failure Maternal Grandmother      Social History     Socioeconomic History   • Marital status:      Spouse name: Not on file   • Number of children: Not on file   • Years of education: Not on file   • Highest education level: Not on file   Occupational History   • Not on file   Tobacco Use   • Smoking status: Current Every Day Smoker     Packs/day: 1.00     Types: Cigarettes   • Smokeless tobacco: Never Used   Substance and Sexual Activity   • Alcohol use: Yes     Alcohol/week: 1.8 oz     Types: 3 Cans of beer per week     Comment: 1 PACK  Q WEEK.    • Drug use: Yes     Comment: marijuana   • Sexual activity: Not on file   Other Topics Concern   • Not on file   Social History Narrative   • Not on file     Social Determinants of Health     Financial Resource Strain:    • Difficulty of Paying Living Expenses:    Food Insecurity:    • Worried About Running Out of Food in the Last Year:    • Ran Out of Food in the Last Year:    Transportation Needs:    • Lack of Transportation (Medical):    • Lack of Transportation (Non-Medical):    Physical Activity:    • Days of Exercise per Week:    • Minutes of Exercise per Session:    Stress:    • Feeling of Stress :    Social Connections:    • Frequency of Communication with Friends and Family:    • Frequency of Social Gatherings with Friends and Family:    • Attends Christian Services:    • Active Member of Clubs or Organizations:    • Attends Club or Organization Meetings:    • Marital Status:    Intimate Partner Violence:    • Fear of Current or Ex-Partner:    • Emotionally Abused:    • Physically Abused:    • Sexually Abused:      Allergies   Allergen Reactions   • Compazine      Outpatient Encounter Medications as of 6/3/2021   Medication Sig Dispense Refill   • clopidogrel (PLAVIX) 75 MG Tab TAKE ONE TABLET BY MOUTH ONE TIME DAILY  30 tablet 0   • hydroCHLOROthiazide (HYDRODIURIL) 50 MG Tab Take 1 tablet by mouth every day. 90 tablet 0   • atorvastatin (LIPITOR) 40 MG Tab TAKE ONE TABLET BY MOUTH ONE TIME DAILY  90 Tab 1   • omeprazole (PRILOSEC) 40 MG delayed-release capsule Take 40 mg by mouth every day.     • venlafaxine (EFFEXOR-XR) 150 MG extended-release capsule Take 150 mg by mouth every day.     • carvedilol (COREG) 12.5 MG Tab Take 6.25 mg by mouth 2 times a day with meals.     • traZODone (DESYREL) 100 MG Tab Take 2 Tabs by mouth at bedtime as needed for Sleep. 30 Tab 0   • aspirin EC 81 MG EC tablet Take 1 Tab by mouth every day. 30 Tab 11   • psyllium (METAMUCIL) 58.12 % Pack Take 1 Packet by mouth  "2 Times a Day. Orange flavor     • nitroglycerin (NITROSTAT) 0.4 MG SUBL Place 0.4 mg under tongue every 5 minutes as needed. Indications: for atypical chest pain     • lamotrigine (LAMICTAL) 100 MG TABS Take 100 mg by mouth 2 Times a Day.     • lamotrigine (LAMICTAL) 150 MG tablet      • [DISCONTINUED] asa/apap/caffeine (EXCEDRIN) 250-250-65 MG TABS Take 1 Tab by mouth every 6 hours as needed. Indications: Headache       No facility-administered encounter medications on file as of 6/3/2021.     Review of Systems   Constitutional: Negative for chills and fever.   HENT: Negative for sore throat.    Eyes: Negative for blurred vision.   Respiratory: Negative for cough and shortness of breath.    Cardiovascular: Negative for chest pain, palpitations, claudication, leg swelling and PND.   Gastrointestinal: Negative for abdominal pain and nausea.   Musculoskeletal: Negative for falls and joint pain.   Skin: Negative for rash.   Neurological: Negative for dizziness, focal weakness and weakness.   Endo/Heme/Allergies: Does not bruise/bleed easily.        Objective:   Ht 1.753 m (5' 9\")   Wt 75.3 kg (166 lb)   BMI 24.51 kg/m²     Physical Exam  Vital signs are reported by the patient    General appears well  Eyes no icterus  Extremities no edema  Skin no apparent rashes    This encounter was conducted via Zoom  Video Virtual Visit.   Verbal consent was obtained. Patient's identity was verified.      We reviewed in person the most recent labs  HDL 50  LDL 56  Chemistry is normal    We did not check her CBC she does report some gynecologic bleeding  Assessment:     1. Coronary artery disease due to lipid rich plaque     2. S/P right coronary artery (RCA) stent placement     3. Tobacco use disorder         Medical Decision Making:  Today's Assessment / Status / Plan:     It was my pleasure to meet with Ms. Hanks.    We addressed the management of hypertension at today's visit. Blood pressure is well controlled.  We " specifically assessed the labs on hypertension treatment    We addressed the management of dyslipidemia at today's visit. She is on appropriate statin.    We addressed the management of antiplatelet therapy at today's visit. We specifically discussed and she understands the importance of  antiplatelet therapy as well as the risk. She can safely stop aspirin We will do Plavix indefinitely      Safe to have hysterectomy possible urologic prcoedure hold plavix as needed      I will see Ms. Hanks back in 1 year time and encouraged her to follow up with us over the phone or electronically using my MyChart as issues arise.    It is my pleasure to participate in the care of Ms. Hanks.  Please do not hesitate to contact me with questions or concerns.    Nabor Holden MD PhD Located within [facility]  Cardiologist [facility] and V[facility]    Please note that this dictation was created using voice recognition software. There may be errors I did not discover before finalizing the note.

## 2021-06-03 NOTE — LETTER
PROCEDURE/SURGERY CLEARANCE FORM      Encounter Date: 6/3/2021    Patient: Stephanie Hanks  YOB: 1970    CARDIOLOGIST:  Nabor Holden M.D.    REFERRING DOCTOR:  ObGyn and Urology      The above patient is cleared to have the following procedure/surgery: hysterectomy                                           She is safe to proceed, no testing required, hold antiplatelet as necessary.    It is my pleasure to participate in the care of Ms. Hanks.  Please do not hesitate to contact me with questions or concerns. [facility] is available 24/7 for consultative services at [phone] in the perioperative period.    Electronically Signed    Nabor Holden MD PhD [facility]  Cardiologist [facility] Heart and Vascular Health    Please note that this dictation was created using voice recognition software. There may be errors I did not discover before finalizing the note.

## 2021-06-04 ENCOUNTER — PATIENT MESSAGE (OUTPATIENT)
Dept: CARDIOLOGY | Facility: PHYSICIAN GROUP | Age: 51
End: 2021-06-04

## 2021-06-07 DIAGNOSIS — I10 HYPERTENSION, UNSPECIFIED TYPE: ICD-10-CM

## 2021-06-07 DIAGNOSIS — Z79.899 HIGH RISK MEDICATION USE: ICD-10-CM

## 2021-06-07 DIAGNOSIS — I25.83 CORONARY ARTERY DISEASE DUE TO LIPID RICH PLAQUE: ICD-10-CM

## 2021-06-07 DIAGNOSIS — I25.10 CORONARY ARTERY DISEASE DUE TO LIPID RICH PLAQUE: ICD-10-CM

## 2021-06-08 ENCOUNTER — TELEPHONE (OUTPATIENT)
Dept: CARDIOLOGY | Facility: MEDICAL CENTER | Age: 51
End: 2021-06-08

## 2021-06-09 ENCOUNTER — TELEPHONE (OUTPATIENT)
Dept: CARDIOLOGY | Facility: MEDICAL CENTER | Age: 51
End: 2021-06-09

## 2021-06-30 ENCOUNTER — PATIENT MESSAGE (OUTPATIENT)
Dept: CARDIOLOGY | Facility: MEDICAL CENTER | Age: 51
End: 2021-06-30

## 2021-07-01 ENCOUNTER — PATIENT MESSAGE (OUTPATIENT)
Dept: CARDIOLOGY | Facility: MEDICAL CENTER | Age: 51
End: 2021-07-01

## 2021-07-02 ENCOUNTER — PATIENT MESSAGE (OUTPATIENT)
Dept: CARDIOLOGY | Facility: MEDICAL CENTER | Age: 51
End: 2021-07-02

## 2021-07-02 NOTE — TELEPHONE ENCOUNTER
From: Stephanie Hanks  To: Nurse Casi SANCHEZ  Sent: 7/1/2021 12:43 PM PDT  Subject: Non-Urgent Medical Question      This question was asked by my Gynocologist. I understand you are a cardiology office and focus on your heart/cardiac needs. That is why I am asking. I had a STMI three years ago and I am under Dr Osmany huang, and I forgot to ask at my last appointment. My previous PHP told me she wouldn't and my GYN, Dr Alvarado wants to know.  I guess my GYN will have to ask.       ----- Message -----   From:Nurse Casi SANCHEZ   Sent:7/1/2021 10:53 AM PDT   To:Stephanie Hanks   Subject:RE: Non-Urgent Medical Question    Todd Brown,    That question will need to be deferred to your Primary Care Provider or your Gynocologist as we are a cardiology office and focus on your heart/cardiac needs.    Thank you,  Casi LOWERY for Dr. Holden      ----- Message -----   From:Stephanie Hanks   Sent:6/30/2021 2:23 PM PDT   To:Physician Nabor Holden   Subject:Non-Urgent Medical Question    I forgot to ask if I can have hormones after my hysterectomy. I still have almost normal periods.     Thank you in advance.   Stephanie

## 2021-07-02 NOTE — PROGRESS NOTES
It increases risk of heart disease but so does early menopause, generally not hormone replacement therapy not advised, but based on quality of life up to her and GYN.

## 2021-07-03 NOTE — TELEPHONE ENCOUNTER
"From: Stephanie Hanks  To: Nurse Casi SANCHEZ  Sent: 7/2/2021 5:01 PM PDT  Subject: Non-Urgent Medical Question      Thank you, Casi. Have a great weekend.       ----- Message -----   From:Nurse Casi SANCHEZ   Sent:7/2/2021 4:59 PM PDT   To:Stephanie Hanks   Subject:RE: Non-Urgent Medical Question    Hi Stephanie,    I relayed your message to Dr. Holden and he states, \"It increases risk of heart disease but so does early menopause, generally not hormone replacement therapy not advised, but based on quality of life up to her and GYN.\"    Thank you,  Casi LOWERY for Dr. Holden      ----- Message -----   From:Stephanie Hanks   Sent:7/1/2021 12:43 PM PDT   To:Nurse Casi SANCHEZ   Subject:Non-Urgent Medical Question      This question was asked by my Gynocologist. I understand you are a cardiology office and focus on your heart/cardiac needs. That is why I am asking. I had a STMI three years ago and I am under Dr Osmany huang, and I forgot to ask at my last appointment. My previous PHP told me she wouldn't and my GYN, Dr Alvarado wants to know.  I guess my GYN will have to ask.       ----- Message -----   From:Nurse Casi SANCHEZ   Sent:7/1/2021 10:53 AM PDT   To:Stephanie Hanks   Subject:RE: Non-Urgent Medical Question    Hi Stephanie,    That question will need to be deferred to your Primary Care Provider or your Gynocologist as we are a cardiology office and focus on your heart/cardiac needs.    Thank you,  Casi LOWERY for Dr. Holden      ----- Message -----   From:Stephanie Hanks   Sent:6/30/2021 2:23 PM PDT   To:Physician Nabor Holden   Subject:Non-Urgent Medical Question    I forgot to ask if I can have hormones after my hysterectomy. I still have almost normal periods.     Thank you in advance.   Stephanie  "

## 2021-09-20 ENCOUNTER — PATIENT MESSAGE (OUTPATIENT)
Dept: CARDIOLOGY | Facility: MEDICAL CENTER | Age: 51
End: 2021-09-20

## 2021-09-21 ENCOUNTER — PATIENT MESSAGE (OUTPATIENT)
Dept: CARDIOLOGY | Facility: MEDICAL CENTER | Age: 51
End: 2021-09-21

## 2023-09-06 ENCOUNTER — OFFICE VISIT (OUTPATIENT)
Dept: CARDIOLOGY | Facility: PHYSICIAN GROUP | Age: 53
End: 2023-09-06
Payer: MEDICARE

## 2023-09-06 VITALS
RESPIRATION RATE: 12 BRPM | OXYGEN SATURATION: 96 % | WEIGHT: 189.15 LBS | BODY MASS INDEX: 28.02 KG/M2 | SYSTOLIC BLOOD PRESSURE: 112 MMHG | HEIGHT: 69 IN | HEART RATE: 96 BPM | DIASTOLIC BLOOD PRESSURE: 70 MMHG

## 2023-09-06 DIAGNOSIS — I10 HYPERTENSION, UNSPECIFIED TYPE: ICD-10-CM

## 2023-09-06 DIAGNOSIS — F17.200 TOBACCO USE DISORDER: ICD-10-CM

## 2023-09-06 DIAGNOSIS — E78.5 DYSLIPIDEMIA: ICD-10-CM

## 2023-09-06 DIAGNOSIS — I25.83 CORONARY ARTERY DISEASE DUE TO LIPID RICH PLAQUE: ICD-10-CM

## 2023-09-06 DIAGNOSIS — I25.10 CORONARY ARTERY DISEASE DUE TO LIPID RICH PLAQUE: ICD-10-CM

## 2023-09-06 DIAGNOSIS — Z95.5 S/P RIGHT CORONARY ARTERY (RCA) STENT PLACEMENT: ICD-10-CM

## 2023-09-06 PROCEDURE — 3078F DIAST BP <80 MM HG: CPT | Performed by: INTERNAL MEDICINE

## 2023-09-06 PROCEDURE — 99214 OFFICE O/P EST MOD 30 MIN: CPT | Performed by: INTERNAL MEDICINE

## 2023-09-06 PROCEDURE — 3074F SYST BP LT 130 MM HG: CPT | Performed by: INTERNAL MEDICINE

## 2023-09-06 RX ORDER — MINOCYCLINE HYDROCHLORIDE 50 MG/1
50 CAPSULE ORAL
COMMUNITY
Start: 2023-08-03

## 2023-09-06 RX ORDER — HYDROCHLOROTHIAZIDE 25 MG/1
25 TABLET ORAL DAILY
Qty: 90 TABLET | Refills: 3 | Status: SHIPPED | OUTPATIENT
Start: 2023-09-06

## 2023-09-06 RX ORDER — ATORVASTATIN CALCIUM 40 MG/1
40 TABLET, FILM COATED ORAL DAILY
Qty: 90 TABLET | Refills: 3 | Status: SHIPPED | OUTPATIENT
Start: 2023-09-06

## 2023-09-06 RX ORDER — CLOPIDOGREL BISULFATE 75 MG/1
75 TABLET ORAL DAILY
Qty: 90 TABLET | Refills: 3 | Status: SHIPPED | OUTPATIENT
Start: 2023-09-06

## 2023-09-06 RX ORDER — CARVEDILOL 12.5 MG/1
12.5 TABLET ORAL 2 TIMES DAILY WITH MEALS
Qty: 180 TABLET | Refills: 3 | Status: SHIPPED | OUTPATIENT
Start: 2023-09-06

## 2023-09-06 ASSESSMENT — ENCOUNTER SYMPTOMS
PND: 0
MYALGIAS: 0
COUGH: 1
LOSS OF CONSCIOUSNESS: 0
SHORTNESS OF BREATH: 0
DIZZINESS: 0
ORTHOPNEA: 0
PALPITATIONS: 0

## 2023-09-06 NOTE — PROGRESS NOTES
Cardiology Initial Consultation Note    Date of note:    9/6/2023    Primary Care Provider: CHRISTINE Mattson  Referring Provider: No ref. provider found    Patient Name: Stephanie Hanks   YOB: 1970  MRN:              [id number]    Chief Complaint   Patient presents with    Chest Pain     FV Dx: Other chest pain       History of Present Illness: Ms. Stephanie Hanks is a 53-year-old woman with past medical history significant for CAD status post PCI to RCA in 2018, dyslipidemia, hypertension, tobacco use disorder who presents to the office for follow up.    Patient is known to Dr. Holden and was last seen back in 2021. She presents to the office today for follow up. She has no major cardiac complaints. She denies having exertional chest pain or dyspnea. No lower extremity edema, orthopnea, PND or syncope. Has been complaint with her cardiac medications including antiplatelet therapy with Plavix.  Denies having any bleeding issues.    She states that she has been traveling recently and developed a nonproductive cough over the past week.  She is concerned of possible URI so she tested herself for COVID which was negative. No other complaints at this time.    Since her last office visit, patient has quit smoking altogether.  Last had a cigarette in February 2023.      Cardiovascular Risk Factors:  1. Smoking status: Former smoker, quit in Feb 2023  2. Type II Diabetes Mellitus: Denies   Lab Results   Component Value Date/Time    HBA1C 5.5 09/01/2018 05:30 AM     3. Hypertension: Yes  4. Dyslipidemia: Yes   Cholesterol,Tot   Date Value Ref Range Status   09/01/2018 139 100 - 199 mg/dL Final     LDL   Date Value Ref Range Status   09/01/2018 60 <100 mg/dL Final     HDL   Date Value Ref Range Status   09/01/2018 32 (A) >=40 mg/dL Final     Triglycerides   Date Value Ref Range Status   09/01/2018 236 (H) 0 - 149 mg/dL Final     5. Family history of early Coronary Artery Disease in a first degree  relative (Male less than 55 years of age; Female less than 65 years of age): Denies      Review of Systems:  Review of Systems   Respiratory:  Positive for cough. Negative for shortness of breath.    Cardiovascular:  Negative for chest pain, palpitations, orthopnea, leg swelling and PND.   Musculoskeletal:  Negative for joint pain and myalgias.   Neurological:  Negative for dizziness and loss of consciousness.       Past Medical History:   Diagnosis Date    Behavior problem     Bipolar 1 disorder, manic, moderate (HCC)     CAD (coronary artery disease) 08/30/2018    Chronic pain syndrome     Drug-seeking behavior     Foot fracture     Former tobacco use     Heart attack (HCC) 08/30/2018    Hep C w/ coma, chronic     Hypothyroid     Migraines     Polycythemia          Past Surgical History:   Procedure Laterality Date    ANGIOPLASTY  08/30/2018    PIC to RCA 3.5 x 23 mm    TIBIA NAILING INTRAMEDULLARY  3/31/2014    Performed by Alistair Holt M.D. at SURGERY [facility]    ORIF, ANKLE  3/31/2014    Performed by Alistair Holt M.D. at SURGERY [facility]         Current Outpatient Medications   Medication Sig Dispense Refill    minocycline (MINOCIN) 50 MG Cap Take 50 mg by mouth at bedtime.      hydroCHLOROthiazide (HYDRODIURIL) 25 MG Tab Take 1 Tablet by mouth every day. 90 Tablet 3    atorvastatin (LIPITOR) 40 MG Tab Take 1 Tablet by mouth every day. 90 Tablet 3    carvedilol (COREG) 12.5 MG Tab Take 1 Tablet by mouth 2 times a day with meals. 180 Tablet 3    clopidogrel (PLAVIX) 75 MG Tab Take 1 Tablet by mouth every day. 90 Tablet 3    nitroglycerin (NITROSTAT) 0.4 MG SL Tab Place 1 tablet under the tongue as needed for Chest Pain. Indications: for atypical chest pain 25 tablet 11    omeprazole (PRILOSEC) 40 MG delayed-release capsule Take 40 mg by mouth every day.      venlafaxine (EFFEXOR-XR) 150 MG extended-release capsule Take 150 mg by mouth every day.      traZODone (DESYREL) 100 MG Tab Take 2 Tabs  "by mouth at bedtime as needed for Sleep. 30 Tab 0    lamotrigine (LAMICTAL) 100 MG TABS Take 100 mg by mouth 2 Times a Day.       No current facility-administered medications for this visit.         Allergies   Allergen Reactions    Compazine          Family History   Problem Relation Age of Onset    Heart Attack Maternal Grandmother 40    Heart Disease Maternal Grandmother     Heart Failure Maternal Grandmother          Social History     Socioeconomic History    Marital status:      Spouse name: Not on file    Number of children: Not on file    Years of education: Not on file    Highest education level: Not on file   Occupational History    Not on file   Tobacco Use    Smoking status: Former     Current packs/day: 1.00     Types: Cigarettes    Smokeless tobacco: Never    Tobacco comments:     VAPE ONLY   Vaping Use    Vaping Use: Every day    Substances: Nicotine, THC   Substance and Sexual Activity    Alcohol use: Not Currently     Alcohol/week: 1.8 oz     Types: 3 Cans of beer per week    Drug use: Yes     Comment: marijuana    Sexual activity: Not on file   Other Topics Concern    Not on file   Social History Narrative    Not on file     Social Determinants of Health     Financial Resource Strain: Not on file   Food Insecurity: Not on file   Transportation Needs: Not on file   Physical Activity: Not on file   Stress: Not on file   Social Connections: Not on file   Intimate Partner Violence: Not on file   Housing Stability: Not on file         Physical Exam:  Ambulatory Vitals  /70 (BP Location: Left arm, Patient Position: Sitting, BP Cuff Size: Adult)   Pulse 96   Resp 12   Ht 1.753 m (5' 9\")   Wt 85.8 kg (189 lb 2.5 oz)   SpO2 96%    Oxygen Therapy:  Pulse Oximetry: 96 %  BP Readings from Last 4 Encounters:   09/06/23 112/70   12/03/19 130/90   09/28/18 132/80   08/31/18 119/77       Weight/BMI: Body mass index is 27.93 kg/m².  Wt Readings from Last 4 Encounters:   09/06/23 85.8 kg (189 lb " 2.5 oz)   08/25/23 83.9 kg (185 lb)   06/03/21 75.3 kg (166 lb)   12/03/19 87.1 kg (192 lb)         General: Not in acute distress, appears comfortable  HEENT: OP clear   Neck:  No carotid bruits, No JVD appreciated  CVS:  RRR, Normal S1, S2. No murmurs, rubs or gallops  Resp: Normal respiratory effort, lungs CTA bilaterally. No rales or rhonchi  Abdomen: Soft, non-distended, non-tender to palpation, no guarding or rigidity  Skin: No obvious rashes, no cyanosis  Neurological: Alert and oriented x3, moves all extremities, no focal neurologic deficits  Psychiatric: Appropriate affect  Extremities:   Extremities warm, 2+ bilateral radial pulses.  2+ bilateral dp pulses, no lower extremity edema bilaterally      Lab Data Review:  Lab Results   Component Value Date/Time    CHOLSTRLTOT 139 09/01/2018 05:30 AM    LDL 60 09/01/2018 05:30 AM    HDL 32 (A) 09/01/2018 05:30 AM    TRIGLYCERIDE 236 (H) 09/01/2018 05:30 AM       Lab Results   Component Value Date/Time    SODIUM 139 09/01/2018 05:30 AM    POTASSIUM 3.8 09/01/2018 05:30 AM    CHLORIDE 107 09/01/2018 05:30 AM    CO2 25 09/01/2018 05:30 AM    GLUCOSE 95 09/01/2018 05:30 AM    BUN 8 09/01/2018 05:30 AM    CREATININE 0.65 09/01/2018 05:30 AM     Lab Results   Component Value Date/Time    ALKPHOSPHAT 60 08/31/2018 03:40 AM    ASTSGOT 63 (H) 08/31/2018 03:40 AM    ALTSGPT 24 08/31/2018 03:40 AM    TBILIRUBIN 0.3 08/31/2018 03:40 AM      Lab Results   Component Value Date/Time    WBC 5.4 09/01/2018 05:30 AM    HEMOGLOBIN 14.3 09/01/2018 05:30 AM     Lab Results   Component Value Date/Time    HBA1C 5.5 09/01/2018 05:30 AM         Cardiac Imaging and Procedures Review:    EKG dated 2019:   My personal interpretation is sinus rhythm, no ischemic changes    Cardiac catheterization 8/30/18:  Left Main: Moderate caliber trifurcating no CAD  Left Anterior Descending: Moderate caliber mild nonobstructive disease.  Usual complement of diagonals.  Left Circumflex: Moderate caliber  nondominant there is a high obtuse marginal/ramus intermedius.  Mild nonobstructive disease.  Right Coronary: Large dominant supplying the posterior lateral posterior descending.  99.9% focal proximal stenosis thrombotic consistent with plaque rupture and the culprit lesion.  Postintervention there is 0% residual stenosis and ROLF-3 flow.     Echo 2018:  Normal left ventricular chamber size.   Normal left ventricular systolic function.   Left ventricular ejection fraction is visually estimated to be 60%.   Normal right ventricular size and systolic function.  Trace mitral regurgitation.       Assessment & Plan     1. Coronary artery disease due to lipid rich plaque  clopidogrel (PLAVIX) 75 MG Tab    Lipid Profile    Basic Metabolic Panel      2. S/P right coronary artery (RCA) stent placement  atorvastatin (LIPITOR) 40 MG Tab    clopidogrel (PLAVIX) 75 MG Tab      3. Dyslipidemia        4. Hypertension, unspecified type  hydroCHLOROthiazide (HYDRODIURIL) 25 MG Tab      5. Tobacco use disorder              Medical Decision Making:  Ms. Stephanie Hanks is a 53-year-old woman with past medical history significant for CAD status post PCI to RCA in 2018, dyslipidemia, hypertension, tobacco use disorder who presents to the office for follow up.    1. Coronary artery disease due to lipid rich plaque  2. S/P right coronary artery (RCA) stent placement  -History of CAD and prior STEMI in 2018 resulting in PCI to RCA.  She has mild nonobstructive disease involving remaining arteries.  Currently stable from a cardiovascular standpoint without exertional symptoms.  -Will be continued on therapy with Plavix 75 mg daily.  Not currently on aspirin 81  -Continue high intensity atorvastatin 40 mg daily.  We will obtain an updated lipid panel to ensure LDL is at goal of less than 70.    3. Dyslipidemia  - Prior LDL at goal. Continue atorvastatin 40mg daily. Obtain updated lipid panel    4. Hypertension, unspecified type  - Blood  pressure is current controlled. Continue Coreg 12.5mg BID. Reduce HCTZ from 50mg to 25mg daily. Doses of HCTZ higher than 25mg increases diuretic effect rather than BP lowering.    5. Tobacco use disorder  - Quit smoking in Feb 2023. Encourage ongoing tobacco cessation.    6. Obesity  - Discussed lifestyle modifications including exercise and dietary changes.  Recommend at least 150 minutes of moderate intensity exercise or 75 minutes of vigorous aerobic activity on a weekly basis.  Recommend dietary changes including incorporating heart healthy, AHA/Mediterranean diet.      It was a pleasure seeing Ms. Stephanie Hanks in the office today. Return in about 1 year (around 9/6/2024). Patient is aware to call the cardiology clinic with any questions or concerns.      Ghanshyam Kelly MD, [facility]  Cardiologist, [facility], [facility]  [address] [address]  Phone: [phone]  Fax: [phone]    Please note that this dictation was created using voice recognition software. I have made every reasonable attempt to correct obvious errors, but it is possible there are errors of grammar and possibly content that I did not discover before finalizing the note.

## 2024-11-05 ENCOUNTER — TELEPHONE (OUTPATIENT)
Dept: CARDIOLOGY | Facility: MEDICAL CENTER | Age: 54
End: 2024-11-05

## 2024-11-05 ENCOUNTER — OFFICE VISIT (OUTPATIENT)
Dept: CARDIOLOGY | Facility: MEDICAL CENTER | Age: 54
End: 2024-11-05
Attending: INTERNAL MEDICINE
Payer: MEDICARE

## 2024-11-05 VITALS
WEIGHT: 187 LBS | HEART RATE: 80 BPM | OXYGEN SATURATION: 99 % | SYSTOLIC BLOOD PRESSURE: 102 MMHG | HEIGHT: 69 IN | DIASTOLIC BLOOD PRESSURE: 60 MMHG | RESPIRATION RATE: 16 BRPM | BODY MASS INDEX: 27.7 KG/M2

## 2024-11-05 DIAGNOSIS — E78.5 DYSLIPIDEMIA: ICD-10-CM

## 2024-11-05 DIAGNOSIS — I25.10 CORONARY ARTERY DISEASE DUE TO LIPID RICH PLAQUE: ICD-10-CM

## 2024-11-05 DIAGNOSIS — I25.83 CORONARY ARTERY DISEASE DUE TO LIPID RICH PLAQUE: ICD-10-CM

## 2024-11-05 DIAGNOSIS — I10 HYPERTENSION, UNSPECIFIED TYPE: ICD-10-CM

## 2024-11-05 DIAGNOSIS — I10 ESSENTIAL HYPERTENSION, BENIGN: ICD-10-CM

## 2024-11-05 DIAGNOSIS — Z95.5 S/P RIGHT CORONARY ARTERY (RCA) STENT PLACEMENT: ICD-10-CM

## 2024-11-05 DIAGNOSIS — I21.11 ST ELEVATION MYOCARDIAL INFARCTION INVOLVING RIGHT CORONARY ARTERY (HCC): ICD-10-CM

## 2024-11-05 DIAGNOSIS — R00.2 PALPITATIONS: ICD-10-CM

## 2024-11-05 PROCEDURE — 3074F SYST BP LT 130 MM HG: CPT | Performed by: INTERNAL MEDICINE

## 2024-11-05 PROCEDURE — G2211 COMPLEX E/M VISIT ADD ON: HCPCS | Performed by: INTERNAL MEDICINE

## 2024-11-05 PROCEDURE — 99213 OFFICE O/P EST LOW 20 MIN: CPT | Performed by: INTERNAL MEDICINE

## 2024-11-05 PROCEDURE — 3078F DIAST BP <80 MM HG: CPT | Performed by: INTERNAL MEDICINE

## 2024-11-05 PROCEDURE — 99214 OFFICE O/P EST MOD 30 MIN: CPT | Performed by: INTERNAL MEDICINE

## 2024-11-05 RX ORDER — CLOPIDOGREL BISULFATE 75 MG/1
75 TABLET ORAL DAILY
Qty: 90 TABLET | Refills: 3 | Status: SHIPPED | OUTPATIENT
Start: 2024-11-05

## 2024-11-05 RX ORDER — HYDROCHLOROTHIAZIDE 25 MG/1
25 TABLET ORAL DAILY
Qty: 90 TABLET | Refills: 3 | Status: SHIPPED | OUTPATIENT
Start: 2024-11-05

## 2024-11-05 RX ORDER — BACLOFEN 10 MG/1
10 TABLET ORAL
COMMUNITY
Start: 2024-06-12

## 2024-11-05 RX ORDER — CARVEDILOL 12.5 MG/1
12.5 TABLET ORAL 2 TIMES DAILY WITH MEALS
Qty: 180 TABLET | Refills: 3 | Status: SHIPPED | OUTPATIENT
Start: 2024-11-05

## 2024-11-05 RX ORDER — NITROGLYCERIN 0.4 MG/1
0.4 TABLET SUBLINGUAL PRN
Qty: 25 TABLET | Refills: 11 | Status: SHIPPED | OUTPATIENT
Start: 2024-11-05

## 2024-11-05 RX ORDER — ATORVASTATIN CALCIUM 40 MG/1
40 TABLET, FILM COATED ORAL DAILY
Qty: 90 TABLET | Refills: 3 | Status: SHIPPED | OUTPATIENT
Start: 2024-11-05

## 2024-11-05 NOTE — PROGRESS NOTES
Chief Complaint   Patient presents with    Chest Pain    Coronary Artery Disease    MI (Non ST Segment Elevation MI)       Subjective     Stephanie Hanks KERRIE Hanks is a 54 y.o. female who presents today with CAD post PCI for STEMI 2018    Has had some atypical CP     Her fiance passed away after CVA    She is hoping to move back to [place]    Past Medical History:   Diagnosis Date    Behavior problem     Bipolar 1 disorder, manic, moderate (HCC)     CAD (coronary artery disease) 08/30/2018    Chronic pain syndrome     Drug-seeking behavior     Foot fracture     Former tobacco use     Heart attack (HCC) 08/30/2018    Hep C w/ coma, chronic     Hypothyroid     Migraines     Polycythemia      Past Surgical History:   Procedure Laterality Date    ANGIOPLASTY  08/30/2018    PIC to RCA 3.5 x 23 mm    TIBIA NAILING INTRAMEDULLARY  3/31/2014    Performed by Alistair Holt M.D. at SURGERY [facility]    ORIF, ANKLE  3/31/2014    Performed by Alistair Holt M.D. at SURGERY [facility]     Family History   Problem Relation Age of Onset    Heart Attack Maternal Grandmother 40    Heart Disease Maternal Grandmother     Heart Failure Maternal Grandmother      Social History     Socioeconomic History    Marital status:      Spouse name: Not on file    Number of children: Not on file    Years of education: Not on file    Highest education level: Not on file   Occupational History    Not on file   Tobacco Use    Smoking status: Former     Current packs/day: 1.00     Types: Cigarettes    Smokeless tobacco: Never    Tobacco comments:     VAPE ONLY   Vaping Use    Vaping status: Every Day    Substances: Nicotine, THC   Substance and Sexual Activity    Alcohol use: Not Currently     Alcohol/week: 1.8 oz     Types: 3 Cans of beer per week    Drug use: Yes     Comment: marijuana    Sexual activity: Not on file   Other Topics Concern    Not on file   Social History Narrative    Not on file     Social Drivers of Health      Financial Resource Strain: Not on file   Food Insecurity: Not on file   Transportation Needs: Not on file   Physical Activity: Not on file   Stress: Not on file   Social Connections: Not on file   Intimate Partner Violence: Not on file   Housing Stability: Not on file     Allergies   Allergen Reactions    Compazine      Outpatient Encounter Medications as of 11/5/2024   Medication Sig Dispense Refill    baclofen (LIORESAL) 10 MG Tab Take 10 mg by mouth.      carvedilol (COREG) 12.5 MG Tab Take 1 Tablet by mouth 2 times a day with meals. 180 Tablet 3    hydroCHLOROthiazide 25 MG Tab Take 1 Tablet by mouth every day. 90 Tablet 3    atorvastatin (LIPITOR) 40 MG Tab Take 1 Tablet by mouth every day. 90 Tablet 3    clopidogrel (PLAVIX) 75 MG Tab Take 1 Tablet by mouth every day. 90 Tablet 3    minocycline (MINOCIN) 50 MG Cap Take 50 mg by mouth at bedtime.      nitroglycerin (NITROSTAT) 0.4 MG SL Tab Place 1 tablet under the tongue as needed for Chest Pain. Indications: for atypical chest pain 25 tablet 11    omeprazole (PRILOSEC) 40 MG delayed-release capsule Take 40 mg by mouth every day.      venlafaxine (EFFEXOR-XR) 150 MG extended-release capsule Take 150 mg by mouth every day.      traZODone (DESYREL) 100 MG Tab Take 2 Tabs by mouth at bedtime as needed for Sleep. 30 Tab 0    lamotrigine (LAMICTAL) 100 MG TABS Take 100 mg by mouth 2 Times a Day.      [DISCONTINUED] hydroCHLOROthiazide (HYDRODIURIL) 25 MG Tab Take 1 Tablet by mouth every day. 90 Tablet 3    [DISCONTINUED] atorvastatin (LIPITOR) 40 MG Tab Take 1 Tablet by mouth every day. 90 Tablet 3    [DISCONTINUED] carvedilol (COREG) 12.5 MG Tab Take 1 Tablet by mouth 2 times a day with meals. 180 Tablet 3    [DISCONTINUED] clopidogrel (PLAVIX) 75 MG Tab Take 1 Tablet by mouth every day. 90 Tablet 3     No facility-administered encounter medications on file as of 11/5/2024.     ROS           Objective     /60 (BP Location: Left arm, Patient Position:  "Sitting, BP Cuff Size: Adult)   Pulse 80   Resp 16   Ht 1.753 m (5' 9\")   Wt 84.8 kg (187 lb)   SpO2 99%   BMI 27.62 kg/m²     Physical Exam  Constitutional:       General: She is not in acute distress.     Appearance: She is not diaphoretic.   Eyes:      General: No scleral icterus.  Neck:      Vascular: No JVD.   Cardiovascular:      Rate and Rhythm: Normal rate.      Heart sounds: Normal heart sounds. No murmur heard.     No friction rub. No gallop.   Pulmonary:      Effort: No respiratory distress.      Breath sounds: No wheezing or rales.   Abdominal:      General: Bowel sounds are normal.      Palpations: Abdomen is soft.   Musculoskeletal:      Right lower leg: No edema.      Left lower leg: No edema.   Skin:     Findings: No rash.   Neurological:      Mental Status: She is alert. Mental status is at baseline.   Psychiatric:         Mood and Affect: Mood normal.            We reviewed in person the most recent labs  Recent Results (from the past 30 weeks)   HEMOGLOBIN A1C    Collection Time: 08/09/24 11:36 AM   Result Value Ref Range    Glycohemoglobin 5.1 4.2 - 5.8 %   COMPLETE CBC & AUTO DIFF WBC    Collection Time: 08/09/24 11:36 AM   Result Value Ref Range    Leukocytes, Absolute 6.4 3.7 - 10.6 x10E3/uL    RBC 4.36 4.19 - 5.21 x10e6/uL    Hemoglobin 13.6 12.3 - 16.0 g/dL    Hematocrit 40.2 36.0 - 47.0 %    MCV 92.1 81.0 - 99.0 fL    MCH 31.3 28.0 - 33.8 pg    MCHC 34.0 33.1 - 36.5 g/dL    RDW 12.6 11.8 - 14.0 %    Platelet Count 233 146 - 390 x10E3/uL    MPV 8.8 6.4 - 10.2 fL    Neutrophils-Polys 59.0 41.7 - 82.3 %    Lymphocytes 31.1 10.8 - 44.4 %    Monocytes 8.0 5.0 - 12.8 %    Eosinophils 1.3 0.0 - 6.6 %    Basophils 0.6 0.0 - 1.3 %    Neutrophils (Absolute) 3.80 1.40 - 8.00 x10E3/uL    Lymphs (Absolute) 2.00 1.00 - 5.20 x10E3/uL    Monos (Absolute) 0.50 0.16 - 1.00 x10E3/uL    Eos (Absolute) 0.10 0.00 - 0.80 x10E3/uL    Baso (Absolute) 0.00 0.00 - 0.30 x10E3/uL         Assessment & Plan     1. " ST elevation myocardial infarction involving right coronary artery (HCC) [I21.11]        2. S/P right coronary artery (RCA) stent placement  atorvastatin (LIPITOR) 40 MG Tab    clopidogrel (PLAVIX) 75 MG Tab      3. Coronary artery disease due to lipid rich plaque  clopidogrel (PLAVIX) 75 MG Tab    [id number] PET W/FLOW RESERVE      4. Dyslipidemia        5. Hypertension, unspecified type  hydroCHLOROthiazide 25 MG Tab      6. Essential hypertension, benign  carvedilol (COREG) 12.5 MG Tab      7. Palpitations  Cardiac Event Monitor          Medical Decision Making: Today's Assessment/Status/Plan:      It was my pleasure to meet with Ms. Hanks.    We addressed the management of hypertension at today's visit. Blood pressure is well controlled.  We specifically assessed the labs on hypertension treatment    We addressed the management of dyslipidemia and atherosclerosis at today's visit. She is on appropriate statin.    We addressed the management of atherosclerotic artery disease.  She is on proper antiplatelet, cholesterol management and beta-blockers as appropriate.  We addressed the potential side effects and laboratory follow-up for these medications.    Stress PET and event monitor      I will see Ms. Hanks back in 1 year time and encouraged her to follow up with us over the phone or electronically using my MyChart as issues arise.    It is my pleasure to participate in the care of Ms. Hanks.  Please do not hesitate to contact me with questions or concerns.    Nabor Holden MD PhD FAC  Cardiologist [facility]    Please note that this dictation was created using voice recognition software. There may be errors I did not discover before finalizing the note.     () Today's E/M visit is associated with medical care services that serve as the continuing focal point for all needed health care services and/or with medical care services that  are part of ongoing  care related to a patient's single, serious condition, or a complex condition: This includes  furnishing services to patients on an ongoing basis that result in care that is personalized  to the patient. The services result in a comprehensive, longitudinal, and continuous  relationship with the patient and involve delivery of team-based care that is accessible, coordinated with other practitioners and providers, and integrated with the broader health  care landscape.

## 2024-11-18 ENCOUNTER — PATIENT MESSAGE (OUTPATIENT)
Dept: CARDIOLOGY | Facility: MEDICAL CENTER | Age: 54
End: 2024-11-18
Payer: MEDICARE

## 2024-11-20 ENCOUNTER — HOSPITAL ENCOUNTER (OUTPATIENT)
Dept: RADIOLOGY | Facility: MEDICAL CENTER | Age: 54
End: 2024-11-20
Attending: INTERNAL MEDICINE
Payer: MEDICARE

## 2024-11-20 ENCOUNTER — NON-PROVIDER VISIT (OUTPATIENT)
Dept: CARDIOLOGY | Facility: MEDICAL CENTER | Age: 54
End: 2024-11-20
Attending: INTERNAL MEDICINE
Payer: MEDICARE

## 2024-11-20 DIAGNOSIS — R00.2 PALPITATIONS: ICD-10-CM

## 2024-11-20 DIAGNOSIS — I25.10 CORONARY ARTERY DISEASE DUE TO LIPID RICH PLAQUE: ICD-10-CM

## 2024-11-20 DIAGNOSIS — I25.83 CORONARY ARTERY DISEASE DUE TO LIPID RICH PLAQUE: ICD-10-CM

## 2024-11-20 PROCEDURE — 78431 MYOCRD IMG PET RST&STRS CT: CPT | Mod: 26 | Performed by: INTERNAL MEDICINE

## 2024-11-20 PROCEDURE — 78431 MYOCRD IMG PET RST&STRS CT: CPT

## 2024-11-20 PROCEDURE — 93246 EXT ECG>7D<15D RECORDING: CPT

## 2024-11-20 PROCEDURE — 78434 AQMBF PET REST & RX STRESS: CPT | Mod: 26 | Performed by: INTERNAL MEDICINE

## 2024-11-20 NOTE — PROGRESS NOTES
Patient enrolled in the 14 day o Holter monitoring program per Nabor Holden MD.  >Office hook-up, serial # [id number].  >Currently pending EOS.

## 2024-11-21 PROCEDURE — 93018 CV STRESS TEST I&R ONLY: CPT | Performed by: INTERNAL MEDICINE

## 2024-12-11 ENCOUNTER — TELEPHONE (OUTPATIENT)
Dept: CARDIOLOGY | Facility: MEDICAL CENTER | Age: 54
End: 2024-12-11
Payer: MEDICARE

## 2024-12-11 NOTE — TELEPHONE ENCOUNTER
Upon chart review EOS already signed by CW   Drysol Counseling:  I discussed with the patient the risks of drysol/aluminum chloride including but not limited to skin rash, itching, irritation, burning.

## 2024-12-11 NOTE — TELEPHONE ENCOUNTER
ERASMO EOS to 's nurse, Casi, on 12/11/2024    Preliminary findings:    Sinus Rhythm  with an avg rate of 80 bpm    Supraventricular ectopics were rare    Isolated ventricular ectopics were rare    11 patient events associated with:  SR 68-90

## 2025-04-02 ENCOUNTER — PATIENT MESSAGE (OUTPATIENT)
Dept: CARDIOLOGY | Facility: MEDICAL CENTER | Age: 55
End: 2025-04-02
Payer: MEDICARE

## 2025-08-18 DIAGNOSIS — I25.10 CORONARY ARTERY DISEASE DUE TO LIPID RICH PLAQUE: ICD-10-CM

## 2025-08-18 DIAGNOSIS — I25.83 CORONARY ARTERY DISEASE DUE TO LIPID RICH PLAQUE: ICD-10-CM
